# Patient Record
Sex: FEMALE | Race: WHITE | NOT HISPANIC OR LATINO | Employment: OTHER | URBAN - NONMETROPOLITAN AREA
[De-identification: names, ages, dates, MRNs, and addresses within clinical notes are randomized per-mention and may not be internally consistent; named-entity substitution may affect disease eponyms.]

---

## 2017-01-17 RX ORDER — FUROSEMIDE 20 MG/1
TABLET ORAL
Qty: 30 TABLET | Refills: 2 | Status: SHIPPED | OUTPATIENT
Start: 2017-01-17 | End: 2017-04-21 | Stop reason: SDUPTHER

## 2017-03-21 RX ORDER — OMEPRAZOLE 20 MG/1
CAPSULE, DELAYED RELEASE ORAL
Qty: 30 CAPSULE | Refills: 7 | OUTPATIENT
Start: 2017-03-21

## 2017-04-21 RX ORDER — FUROSEMIDE 20 MG/1
TABLET ORAL
Qty: 30 TABLET | Refills: 0 | Status: SHIPPED | OUTPATIENT
Start: 2017-04-21 | End: 2018-03-22 | Stop reason: SDUPTHER

## 2017-05-08 ENCOUNTER — OFFICE VISIT (OUTPATIENT)
Dept: CARDIOLOGY | Facility: CLINIC | Age: 72
End: 2017-05-08

## 2017-05-08 VITALS
DIASTOLIC BLOOD PRESSURE: 58 MMHG | HEIGHT: 60 IN | HEART RATE: 60 BPM | BODY MASS INDEX: 36.32 KG/M2 | WEIGHT: 185 LBS | SYSTOLIC BLOOD PRESSURE: 110 MMHG

## 2017-05-08 DIAGNOSIS — R06.02 SHORTNESS OF BREATH: ICD-10-CM

## 2017-05-08 DIAGNOSIS — E78.00 PURE HYPERCHOLESTEROLEMIA: ICD-10-CM

## 2017-05-08 DIAGNOSIS — I25.9 IHD (ISCHEMIC HEART DISEASE): Primary | ICD-10-CM

## 2017-05-08 DIAGNOSIS — I25.118 CORONARY ARTERY DISEASE OF NATIVE ARTERY OF NATIVE HEART WITH STABLE ANGINA PECTORIS (HCC): ICD-10-CM

## 2017-05-08 DIAGNOSIS — I10 ESSENTIAL HYPERTENSION: ICD-10-CM

## 2017-05-08 DIAGNOSIS — F17.210 CIGARETTE SMOKER: ICD-10-CM

## 2017-05-08 PROCEDURE — 99213 OFFICE O/P EST LOW 20 MIN: CPT | Performed by: NURSE PRACTITIONER

## 2017-05-08 RX ORDER — ALPRAZOLAM 1 MG/1
1 TABLET ORAL 2 TIMES DAILY
COMMUNITY
End: 2019-01-02 | Stop reason: DRUGHIGH

## 2017-05-08 RX ORDER — PRAVASTATIN SODIUM 40 MG
40 TABLET ORAL DAILY
COMMUNITY
End: 2018-03-22 | Stop reason: SDUPTHER

## 2017-05-08 RX ORDER — NITROGLYCERIN 120 MG/1
1 PATCH TRANSDERMAL DAILY
Qty: 30 PATCH | Refills: 8 | Status: SHIPPED | OUTPATIENT
Start: 2017-05-08 | End: 2018-02-16 | Stop reason: SDUPTHER

## 2017-05-08 RX ORDER — FENOFIBRATE 145 MG/1
145 TABLET, COATED ORAL DAILY
COMMUNITY
End: 2018-03-22 | Stop reason: SDUPTHER

## 2017-05-08 RX ORDER — AMLODIPINE BESYLATE 2.5 MG/1
2.5 TABLET ORAL DAILY
COMMUNITY
End: 2017-05-08 | Stop reason: SDUPTHER

## 2017-05-08 RX ORDER — RANOLAZINE 1000 MG/1
1000 TABLET, EXTENDED RELEASE ORAL 2 TIMES DAILY
COMMUNITY
End: 2017-10-23 | Stop reason: SDUPTHER

## 2017-05-08 RX ORDER — AMLODIPINE BESYLATE 2.5 MG/1
2.5 TABLET ORAL DAILY
Qty: 30 TABLET | Refills: 8 | Status: SHIPPED | OUTPATIENT
Start: 2017-05-08 | End: 2017-09-21 | Stop reason: ALTCHOICE

## 2017-05-08 RX ORDER — NAPROXEN 375 MG/1
375 TABLET ORAL 2 TIMES DAILY PRN
COMMUNITY
End: 2017-09-21 | Stop reason: ALTCHOICE

## 2017-05-26 RX ORDER — OMEPRAZOLE 20 MG/1
CAPSULE, DELAYED RELEASE ORAL
Qty: 30 CAPSULE | Refills: 7 | OUTPATIENT
Start: 2017-05-26

## 2017-06-05 RX ORDER — FUROSEMIDE 20 MG/1
TABLET ORAL
Qty: 30 TABLET | Refills: 0 | OUTPATIENT
Start: 2017-06-05

## 2017-06-05 NOTE — TELEPHONE ENCOUNTER
Per labs 2/27/17 patient's bun 28.0, creatinine 1.12, bun creatinine ratio 25.0 is it okay to fill lasix?

## 2017-06-20 RX ORDER — PRAVASTATIN SODIUM 40 MG
TABLET ORAL
Qty: 30 TABLET | Refills: 7 | Status: SHIPPED | OUTPATIENT
Start: 2017-06-20 | End: 2018-03-18 | Stop reason: SDUPTHER

## 2017-06-20 RX ORDER — FENOFIBRATE 145 MG/1
TABLET, COATED ORAL
Qty: 30 TABLET | Refills: 7 | Status: SHIPPED | OUTPATIENT
Start: 2017-06-20 | End: 2017-09-21 | Stop reason: SDUPTHER

## 2017-07-21 ENCOUNTER — TELEPHONE (OUTPATIENT)
Dept: CARDIOLOGY | Facility: CLINIC | Age: 72
End: 2017-07-21

## 2017-07-21 RX ORDER — ASPIRIN 81 MG/1
81 TABLET ORAL DAILY
Qty: 30 TABLET | Refills: 3 | Status: SHIPPED | OUTPATIENT
Start: 2017-07-21 | End: 2017-11-30 | Stop reason: SDUPTHER

## 2017-07-21 NOTE — TELEPHONE ENCOUNTER
Pharmacy requesting refill on RA Aspirin EC 81mg once daily. Refill on RA Aspirin EC 81mg daily sent to pharmacy.

## 2017-08-11 ENCOUNTER — OUTSIDE FACILITY SERVICE (OUTPATIENT)
Dept: CARDIOLOGY | Facility: CLINIC | Age: 72
End: 2017-08-11

## 2017-08-11 PROCEDURE — 93018 CV STRESS TEST I&R ONLY: CPT | Performed by: INTERNAL MEDICINE

## 2017-08-11 PROCEDURE — 93306 TTE W/DOPPLER COMPLETE: CPT | Performed by: INTERNAL MEDICINE

## 2017-08-11 PROCEDURE — 78452 HT MUSCLE IMAGE SPECT MULT: CPT | Performed by: INTERNAL MEDICINE

## 2017-08-11 PROCEDURE — 99223 1ST HOSP IP/OBS HIGH 75: CPT | Performed by: INTERNAL MEDICINE

## 2017-08-12 ENCOUNTER — OUTSIDE FACILITY SERVICE (OUTPATIENT)
Dept: CARDIOLOGY | Facility: CLINIC | Age: 72
End: 2017-08-12

## 2017-08-12 PROCEDURE — 99232 SBSQ HOSP IP/OBS MODERATE 35: CPT | Performed by: INTERNAL MEDICINE

## 2017-08-14 ENCOUNTER — OUTSIDE FACILITY SERVICE (OUTPATIENT)
Dept: CARDIOLOGY | Facility: CLINIC | Age: 72
End: 2017-08-14

## 2017-08-14 PROCEDURE — 99232 SBSQ HOSP IP/OBS MODERATE 35: CPT | Performed by: INTERNAL MEDICINE

## 2017-08-15 ENCOUNTER — OUTSIDE FACILITY SERVICE (OUTPATIENT)
Dept: CARDIOLOGY | Facility: CLINIC | Age: 72
End: 2017-08-15

## 2017-08-15 PROCEDURE — 99232 SBSQ HOSP IP/OBS MODERATE 35: CPT | Performed by: INTERNAL MEDICINE

## 2017-08-17 ENCOUNTER — OUTSIDE FACILITY SERVICE (OUTPATIENT)
Dept: CARDIOLOGY | Facility: CLINIC | Age: 72
End: 2017-08-17

## 2017-08-17 PROCEDURE — 99232 SBSQ HOSP IP/OBS MODERATE 35: CPT | Performed by: INTERNAL MEDICINE

## 2017-09-21 ENCOUNTER — OFFICE VISIT (OUTPATIENT)
Dept: CARDIOLOGY | Facility: CLINIC | Age: 72
End: 2017-09-21

## 2017-09-21 VITALS — SYSTOLIC BLOOD PRESSURE: 112 MMHG | HEIGHT: 60 IN | HEART RATE: 64 BPM | DIASTOLIC BLOOD PRESSURE: 54 MMHG

## 2017-09-21 DIAGNOSIS — I25.9 IHD (ISCHEMIC HEART DISEASE): ICD-10-CM

## 2017-09-21 DIAGNOSIS — R42 DIZZINESS: ICD-10-CM

## 2017-09-21 DIAGNOSIS — I10 ESSENTIAL HYPERTENSION: ICD-10-CM

## 2017-09-21 DIAGNOSIS — E78.00 PURE HYPERCHOLESTEROLEMIA: ICD-10-CM

## 2017-09-21 DIAGNOSIS — Z72.0 TOBACCO ABUSE: ICD-10-CM

## 2017-09-21 DIAGNOSIS — I73.9 PERIPHERAL VASCULAR DISEASE OF LOWER EXTREMITY (HCC): Primary | ICD-10-CM

## 2017-09-21 PROCEDURE — 99214 OFFICE O/P EST MOD 30 MIN: CPT | Performed by: NURSE PRACTITIONER

## 2017-09-21 RX ORDER — METOPROLOL TARTRATE 50 MG/1
50 TABLET, FILM COATED ORAL 2 TIMES DAILY
COMMUNITY
End: 2018-03-22 | Stop reason: SDUPTHER

## 2017-09-21 RX ORDER — CLOPIDOGREL BISULFATE 75 MG/1
75 TABLET ORAL DAILY
COMMUNITY
End: 2018-03-22 | Stop reason: SDUPTHER

## 2017-09-21 NOTE — PROGRESS NOTES
Chief Complaint   Patient presents with   • Hospital Follow-up     Denies shortness of breath or palpitations. Pharmacy will call if refills are needed. Consult and echo in patient chart. Will obtain MARIAH and stress. Wanted to talk about her BP meds- says her BP has been running low.    • Chest Pain     Occasional tightness in her chest.        Subjective       Ana Lenz is a 71 y.o. female  with a history IHD diagnosed in 2016 when she underwent bypass surgery and LA clipping.  She also has history of CVA for which she has been on Coumadin since 2002.  In November of 2015, she underwent stress and echo which showed no significant ischemia.  She continued to be managed medically until symptoms worsened and cardiac cath showed significant disease of LAD, left main, and RCA.  At her last follow up she reported more symtpoms. Stress test showed small area of latera wall ischemia. The dose of Imdur was increased which was later changed to nitro patch secondary to GI discomfort.  She recently developed more chest pain and leg pain, ischemic ulcer on left foot and swelling, L>R, and was admitted to the hospital.  Her stress test showed anteroseptal infarct with mild tanya-infarct ischemia and LVEF 53%.  She then underwent arterial ultrasound which showed significant abnormality followed by peripheral angiogram with angioplasty and stenting of the left mid and distal superficial femoral artery.  She was discharged with Plavix.  She came in today for her follow up visit with significant improvement of leg pain.  She hasn't had anymore chest pain, and shortness of breath is about the same.  Her main problem now is dizziness and low blood pressure, apparently down to 80s/60s at times.  She unfortunately continues to smoke but trying to cut down.     HPI         Cardiac History:    Past Surgical History:   Procedure Laterality Date   • CARDIAC CATHETERIZATION  10/22/2003    non-critical disease of LAD and RCA   • CARDIAC  CATHETERIZATION  12/06/2006    sig calcification. IVUS showed no sig lesions   • CARDIAC CATHETERIZATION  02/26/2016    LM 60% (IVUS) 90% LAD, 70% RCA   • CARDIOVASCULAR STRESS TEST  03/25/2008    D. Myoview- 53% THR, baseline LBBB, R/O ischemia   • CARDIOVASCULAR STRESS TEST  05/30/2013    L Myoview (LBBB) negative    • CARDIOVASCULAR STRESS TEST  11/09/2015    L. Myoview- (LBBB) Negative    • CARDIOVASCULAR STRESS TEST  10/26/2016    Lexiscan-small lateral wall ischemia, increase Imdur, cath is symptoms persist   • CONVERTED (HISTORICAL) HOLTER  08/12/2008    AVG HR 88 bpm, LBBB, rare PVC's and PAC's   • CORONARY ARTERY BYPASS GRAFT  03/18/2016    (Dr. Hernandez) LIMA-LAD, SVG-D1, SVG-PDA, LA clipping   • ECHO - CONVERTED  03/25/2008    EF >60%, LBBB   • ECHO - CONVERTED  01/31/2012    EF 60-65%, RVSP 41 mmHg   • ECHO - CONVERTED  05/30/2013    EF 60-65%, RVSP 44 mmHg   • ECHO - CONVERTED  11/09/2015    EF 65%. RVSP- 50 mmHg    • US CAROTID UNILATERAL  09/03/2008    <49% stenosis bilat   • US CAROTID UNILATERAL  11/18/2015    normal       Current Outpatient Prescriptions   Medication Sig Dispense Refill   • ALPRAZolam (XANAX) 1 MG tablet Take 1 mg by mouth 2 (Two) Times a Day As Needed for Anxiety.     • aspirin (RA ASPIRIN EC) 81 MG EC tablet Take 1 tablet by mouth Daily. 30 tablet 3   • cholecalciferol (VITAMIN D3) 1000 UNITS tablet Take 1,000 Units by mouth 2 (Two) Times a Day.     • clopidogrel (PLAVIX) 75 MG tablet Take 75 mg by mouth Daily.     • fenofibrate (TRICOR) 145 MG tablet Take 145 mg by mouth Daily.     • ferrous sulfate 325 (65 FE) MG tablet Take 325 mg by mouth Daily With Breakfast.     • furosemide (LASIX) 20 MG tablet take 1 tablet by mouth once daily 30 tablet 0   • gabapentin (NEURONTIN) 300 MG capsule Take 300 mg by mouth 3 (Three) Times a Day.     • loratadine (CLARITIN) 10 MG tablet Take 10 mg by mouth Daily.     • metoprolol tartrate (LOPRESSOR) 50 MG tablet Take 50 mg by mouth 2 (Two) Times a  Day.     • nitroglycerin (NITRODUR) 0.6 MG/HR patch Place 1 patch on the skin Daily. 30 patch 8   • O2 (OXYGEN) 1.5 liters PRN     • oxyCODONE-acetaminophen (PERCOCET) 5-325 MG per tablet Take 1 tablet by mouth Every 6 (Six) Hours As Needed.     • potassium chloride (K-DUR,KLOR-CON) 20 MEQ CR tablet Take 20 mEq by mouth Daily.     • pravastatin (PRAVACHOL) 40 MG tablet take 1 tablet by mouth once daily 30 tablet 7   • ranolazine (RANEXA) 1000 MG 12 hr tablet Take 1,000 mg by mouth 2 (Two) Times a Day.     • pravastatin (PRAVACHOL) 40 MG tablet Take 40 mg by mouth Daily.       No current facility-administered medications for this visit.        Morphine and related and Prednisone    Past Medical History:   Diagnosis Date   • Chest pain    • DDD (degenerative disc disease), lumbar    • H/O: hysterectomy    • Hx of appendectomy    • Hx of cholecystectomy    • IHD (ischemic heart disease)    • LBBB (left bundle branch block)    • Palpitations    • S/P cataract surgery     left   • SOB (shortness of breath)        Social History     Social History   • Marital status:      Spouse name: N/A   • Number of children: N/A   • Years of education: N/A     Occupational History   • Not on file.     Social History Main Topics   • Smoking status: Current Every Day Smoker     Packs/day: 0.50     Types: Cigarettes   • Smokeless tobacco: Never Used   • Alcohol use No   • Drug use: No   • Sexual activity: Not on file     Other Topics Concern   • Not on file     Social History Narrative       Family History   Problem Relation Age of Onset   • Heart disease Mother    • Heart disease Father    • Heart disease Other      CABG   • Heart attack Daughter    • Heart disease Daughter        Review of Systems   Constitution: Positive for weakness and malaise/fatigue.   Eyes: Negative for blurred vision.   Cardiovascular: Positive for dyspnea on exertion. Negative for chest pain and leg swelling (improved).   Respiratory: Positive for cough  "(r/t smoking ) and shortness of breath (stable ).    Skin: Positive for color change and poor wound healing (improved post stenting ).   Musculoskeletal: Positive for back pain and muscle weakness.   Gastrointestinal: Negative for abdominal pain, heartburn, nausea and vomiting.   Genitourinary: Negative for dysuria and hematuria.   Neurological: Positive for dizziness (r/t low bp ) and light-headedness.   Psychiatric/Behavioral: Negative for altered mental status and depression.        Diabetes- No  Thyroid-not available     Objective     /54  Pulse 64  Ht 60\" (152.4 cm)    Physical Exam   Constitutional: She is oriented to person, place, and time. She appears well-developed and well-nourished.   HENT:   Head: Normocephalic and atraumatic.   Eyes: Pupils are equal, round, and reactive to light.   Neck: Normal range of motion. Neck supple. No thyromegaly present.   Cardiovascular: Normal rate, regular rhythm, S1 normal, S2 normal and intact distal pulses.    Murmur heard.  Pulses:       Dorsalis pedis pulses are 1+ on the right side, and 2+ on the left side.        Posterior tibial pulses are 1+ on the right side, and 2+ on the left side.   Pulmonary/Chest: Effort normal. No respiratory distress. She has wheezes.   Abdominal: Soft. Bowel sounds are normal. She exhibits no distension. There is no tenderness.   Musculoskeletal: Normal range of motion. She exhibits edema (trace-1+ Left).   Neurological: She is alert and oriented to person, place, and time. She has normal reflexes.   Skin: Skin is warm and dry. There is erythema (LLE mildy erythematous with good pulse).   Psychiatric: She has a normal mood and affect. Her behavior is normal.          Procedures          Assessment/Plan      Heart rate is stable.  Blood pressure low normal.  Based on her reports and today's bp, she was advised to hold amlodipine for now.  Continue all other medications.  She is ambulating better with her walker.  She has received " wound care for her left foot ulcer which she reports has improved.  She isn't having any chest pain, so she will continue to be monitored and managed medically.  She was encouraged again on smoking cessation especially with her recent peripheral stent.  She was advised to monitor blood pressure and let us know if remains low.  We will see her back in six months or sooner if she develops more problems.      Ana was seen today for hospital follow-up and chest pain.    Diagnoses and all orders for this visit:    Peripheral vascular disease of lower extremity    IHD (ischemic heart disease)    Dizziness    Essential hypertension    Tobacco abuse    Pure hypercholesterolemia                    Electronically signed by JOSE Dominguez,  September 22, 2017 11:47 AM

## 2017-10-23 RX ORDER — RANOLAZINE 1000 MG/1
TABLET, FILM COATED, EXTENDED RELEASE ORAL
Qty: 60 TABLET | Refills: 7 | Status: SHIPPED | OUTPATIENT
Start: 2017-10-23 | End: 2018-03-22 | Stop reason: SDUPTHER

## 2017-11-30 RX ORDER — ACETAMINOPHEN/DIPHENHYDRAMINE 500MG-25MG
TABLET ORAL
Qty: 30 TABLET | Refills: 11 | Status: SHIPPED | OUTPATIENT
Start: 2017-11-30 | End: 2018-05-22 | Stop reason: SDUPTHER

## 2018-02-16 RX ORDER — NITROGLYCERIN 120 MG/1
PATCH TRANSDERMAL
Qty: 30 PATCH | Refills: 8 | Status: SHIPPED | OUTPATIENT
Start: 2018-02-16 | End: 2018-03-22 | Stop reason: SDUPTHER

## 2018-03-20 RX ORDER — PRAVASTATIN SODIUM 40 MG
TABLET ORAL
Qty: 30 TABLET | Refills: 7 | Status: SHIPPED | OUTPATIENT
Start: 2018-03-20 | End: 2018-03-22 | Stop reason: SDUPTHER

## 2018-03-20 RX ORDER — FENOFIBRATE 145 MG/1
TABLET, COATED ORAL
Qty: 30 TABLET | Refills: 7 | Status: SHIPPED | OUTPATIENT
Start: 2018-03-20 | End: 2018-03-22 | Stop reason: SDUPTHER

## 2018-03-20 NOTE — TELEPHONE ENCOUNTER
Yes, refill please.      She is coming in for follow up in 2 days.  Will address then.      Thank you

## 2018-03-20 NOTE — TELEPHONE ENCOUNTER
Estrella    Received refill request on fenofibrate and pravachol. No recent labs in chart. Is it okay to refill? Thank you.

## 2018-03-22 ENCOUNTER — OFFICE VISIT (OUTPATIENT)
Dept: CARDIOLOGY | Facility: CLINIC | Age: 73
End: 2018-03-22

## 2018-03-22 VITALS — SYSTOLIC BLOOD PRESSURE: 120 MMHG | HEIGHT: 60 IN | HEART RATE: 60 BPM | DIASTOLIC BLOOD PRESSURE: 70 MMHG

## 2018-03-22 DIAGNOSIS — I10 ESSENTIAL HYPERTENSION: ICD-10-CM

## 2018-03-22 DIAGNOSIS — Z72.0 TOBACCO ABUSE: ICD-10-CM

## 2018-03-22 DIAGNOSIS — R00.2 PALPITATIONS: ICD-10-CM

## 2018-03-22 DIAGNOSIS — I73.9 PVD (PERIPHERAL VASCULAR DISEASE) (HCC): ICD-10-CM

## 2018-03-22 DIAGNOSIS — E78.00 PURE HYPERCHOLESTEROLEMIA: ICD-10-CM

## 2018-03-22 DIAGNOSIS — I25.9 IHD (ISCHEMIC HEART DISEASE): Primary | ICD-10-CM

## 2018-03-22 PROCEDURE — 99406 BEHAV CHNG SMOKING 3-10 MIN: CPT | Performed by: NURSE PRACTITIONER

## 2018-03-22 PROCEDURE — 99214 OFFICE O/P EST MOD 30 MIN: CPT | Performed by: NURSE PRACTITIONER

## 2018-03-22 RX ORDER — FENOFIBRATE 145 MG/1
145 TABLET, COATED ORAL DAILY
Qty: 30 TABLET | Refills: 7 | Status: SHIPPED | OUTPATIENT
Start: 2018-03-22 | End: 2018-05-22 | Stop reason: ALTCHOICE

## 2018-03-22 RX ORDER — POTASSIUM CHLORIDE 20 MEQ/1
20 TABLET, EXTENDED RELEASE ORAL DAILY
Qty: 30 TABLET | Refills: 8 | Status: SHIPPED | OUTPATIENT
Start: 2018-03-22 | End: 2019-01-02

## 2018-03-22 RX ORDER — CLOPIDOGREL BISULFATE 75 MG/1
75 TABLET ORAL DAILY
Qty: 30 TABLET | Refills: 7 | Status: SHIPPED | OUTPATIENT
Start: 2018-03-22 | End: 2019-05-13 | Stop reason: SDUPTHER

## 2018-03-22 RX ORDER — RANOLAZINE 1000 MG/1
1 TABLET, EXTENDED RELEASE ORAL EVERY 12 HOURS
Qty: 60 TABLET | Refills: 7 | Status: SHIPPED | OUTPATIENT
Start: 2018-03-22 | End: 2019-01-02 | Stop reason: SDUPTHER

## 2018-03-22 RX ORDER — FUROSEMIDE 20 MG/1
20 TABLET ORAL DAILY
Qty: 30 TABLET | Refills: 7 | Status: SHIPPED | OUTPATIENT
Start: 2018-03-22 | End: 2019-01-02

## 2018-03-22 RX ORDER — METOPROLOL TARTRATE 50 MG/1
50 TABLET, FILM COATED ORAL EVERY 12 HOURS SCHEDULED
Qty: 60 TABLET | Refills: 7 | Status: SHIPPED | OUTPATIENT
Start: 2018-03-22 | End: 2018-05-22 | Stop reason: DRUGHIGH

## 2018-03-22 RX ORDER — NITROGLYCERIN 120 MG/1
1 PATCH TRANSDERMAL DAILY
Qty: 30 PATCH | Refills: 8 | Status: SHIPPED | OUTPATIENT
Start: 2018-03-22 | End: 2019-01-02 | Stop reason: SDUPTHER

## 2018-03-22 RX ORDER — PRAVASTATIN SODIUM 40 MG
40 TABLET ORAL DAILY
Qty: 30 TABLET | Refills: 7 | Status: SHIPPED | OUTPATIENT
Start: 2018-03-22 | End: 2018-05-22 | Stop reason: ALTCHOICE

## 2018-03-27 ENCOUNTER — OUTSIDE FACILITY SERVICE (OUTPATIENT)
Dept: CARDIOLOGY | Facility: CLINIC | Age: 73
End: 2018-03-27

## 2018-03-27 PROCEDURE — 99223 1ST HOSP IP/OBS HIGH 75: CPT | Performed by: INTERNAL MEDICINE

## 2018-03-27 PROCEDURE — 93308 TTE F-UP OR LMTD: CPT | Performed by: INTERNAL MEDICINE

## 2018-03-28 ENCOUNTER — OUTSIDE FACILITY SERVICE (OUTPATIENT)
Dept: CARDIOLOGY | Facility: CLINIC | Age: 73
End: 2018-03-28

## 2018-03-28 PROCEDURE — 93459 L HRT ART/GRFT ANGIO: CPT | Performed by: INTERNAL MEDICINE

## 2018-03-29 ENCOUNTER — OUTSIDE FACILITY SERVICE (OUTPATIENT)
Dept: CARDIOLOGY | Facility: CLINIC | Age: 73
End: 2018-03-29

## 2018-03-29 PROCEDURE — 99232 SBSQ HOSP IP/OBS MODERATE 35: CPT | Performed by: INTERNAL MEDICINE

## 2018-05-22 ENCOUNTER — OFFICE VISIT (OUTPATIENT)
Dept: CARDIOLOGY | Facility: CLINIC | Age: 73
End: 2018-05-22

## 2018-05-22 VITALS — HEIGHT: 60 IN | SYSTOLIC BLOOD PRESSURE: 110 MMHG | DIASTOLIC BLOOD PRESSURE: 60 MMHG | HEART RATE: 64 BPM

## 2018-05-22 DIAGNOSIS — I73.9 PVD (PERIPHERAL VASCULAR DISEASE) (HCC): ICD-10-CM

## 2018-05-22 DIAGNOSIS — I25.9 IHD (ISCHEMIC HEART DISEASE): Primary | ICD-10-CM

## 2018-05-22 DIAGNOSIS — E78.00 PURE HYPERCHOLESTEROLEMIA: ICD-10-CM

## 2018-05-22 DIAGNOSIS — Z72.0 TOBACCO ABUSE: ICD-10-CM

## 2018-05-22 DIAGNOSIS — I10 ESSENTIAL HYPERTENSION: ICD-10-CM

## 2018-05-22 PROCEDURE — 99214 OFFICE O/P EST MOD 30 MIN: CPT | Performed by: NURSE PRACTITIONER

## 2018-05-22 RX ORDER — GABAPENTIN 100 MG/1
100 CAPSULE ORAL 3 TIMES DAILY
COMMUNITY
End: 2019-01-02 | Stop reason: DRUGHIGH

## 2018-05-22 RX ORDER — ASPIRIN 81 MG/1
TABLET ORAL
Qty: 60 TABLET | Refills: 11 | Status: SHIPPED | OUTPATIENT
Start: 2018-05-22 | End: 2019-08-19 | Stop reason: SDUPTHER

## 2018-05-22 RX ORDER — ATORVASTATIN CALCIUM 10 MG/1
10 TABLET, FILM COATED ORAL DAILY
COMMUNITY
End: 2018-06-06

## 2018-05-22 RX ORDER — NITROGLYCERIN 0.4 MG/1
TABLET SUBLINGUAL
Qty: 100 TABLET | Refills: 11 | Status: SHIPPED | OUTPATIENT
Start: 2018-05-22

## 2018-05-22 NOTE — PROGRESS NOTES
"Chief Complaint   Patient presents with   • Hospital follow up     Patient had cardiac cath 3/28/18 with stent placement. She reports having multiple falls in the last year. Daughter reports Metoprolol was decreased to 12.5mg bid. Daughter verbalized current medication list. Last labs 5/4/18 per PCP.   • Chest Pain     She states \"having some sharp pains and some burning pains all over chest.   • Shortness of Breath     She reports with exertion.       Subjective       Ana Lenz is a 72 y.o. female with hypertension, hyperlipidemia, PVD, and IHD s/p bypass surgery and LA clipping in March 2016.  She also has a history of CVA and was treated with Coumadin until clipping.  Stress test done in October 2016 showed small lateral ischemia with medical management and increase in Imdur then later changed to nitro patch secondary to GI discomfort  She then developed more chest pain and leg pain, ischemic ulcer on left foot with swelling, L>R, and was admitted to the hospital.  Her stress test showed anteroseptal infarct with mild tanya-infarct ischemia and LVEF 53%.  She then underwent arterial ultrasound which showed significant abnormality followed by peripheral angiogram with angioplasty and stenting of the left mid and distal superficial femoral artery on 8/16/17.  She was then discharged with Plavix. She came in recently for follow up visit and reported some mild chest pain but declined any investigation.  Five days later she presented to the ER with chest pain and significantly elevated bp.  MI was ruled out and she underwent echo and stress showing anterior wall ischemia with EF around 50%.  Cardiac cath showed worsening left main disease and disease of circumflex which were stented.  At discharge, metoprolol was reduced to 12.5 mg BID for bradycardia, and pravastatin changed to Lipitor.  CXR showed rounded, mass-like density in the left lung with further evaluation recommended. History is notable for " histoplasmosis.  Arterial doppler showed several distal vascular disease.  Inpatient labs:  CBC stable, cholesterol 179, Tri 239, HDL 34, .  BUN/Cr 35/1.1, TSH 0.82, CrCl 67, P2Y12 196, indicating adequate platelet inhibition.  She came in today for her hospital follow up visit with her daughter.  Overall, she has had improvement in cardiac symptoms.  Chest pain is present and described as sharp and burning at times but less often and less severe.  She is mostly confined to wheelchair so does not exert much.  She denies leg pain at rest.  She is short of breath at times.  Her daughter feels chest pain and SOB are worse with mental and emotional stress.  She continues to smoke but has cut back to four per day.  Labs were recently checked with Dr. Gamboa.  Daughter reports fenofibrate stopped when atorvastatin started.    HPI         Cardiac History:    Past Surgical History:   Procedure Laterality Date   • CARDIAC CATHETERIZATION  10/22/2003    non-critical disease of LAD and RCA   • CARDIAC CATHETERIZATION  12/06/2006    sig calcification. IVUS showed no sig lesions   • CARDIAC CATHETERIZATION  02/26/2016    LM 60% (IVUS) 90% LAD, 70% RCA   • CARDIAC CATHETERIZATION  03/28/2018    100% SVG to D1. 95% LM 4.0x12. 75% LCX- 3.0x28 & 3.5x8 Promus   • CARDIOVASCULAR STRESS TEST  03/25/2008    D. Myoview- 53% THR, baseline LBBB, R/O ischemia   • CARDIOVASCULAR STRESS TEST  05/30/2013    L Myoview (LBBB) negative    • CARDIOVASCULAR STRESS TEST  11/09/2015    LKristine Myoview- (LBBB) Negative    • CARDIOVASCULAR STRESS TEST  10/26/2016    Lexiscan-small lateral wall ischemia, increase Imdur, cath is symptoms persist   • CARDIOVASCULAR STRESS TEST  03/26/2018    @[email or web address]. Dr. Cabrera. SARITHA Smith- Anterior Ischemia   • CONVERTED (HISTORICAL) HOLTER  08/12/2008    AVG HR 88 bpm, LBBB, rare PVC's and PAC's   • CORONARY ARTERY BYPASS GRAFT  03/18/2016    (Dr. Hernandez) LIMA-LAD, SVG-D1, SVG-PDA, LA clipping   • ECHO - CONVERTED   03/25/2008    EF >60%, LBBB   • ECHO - CONVERTED  01/31/2012    EF 60-65%, RVSP 41 mmHg   • ECHO - CONVERTED  05/30/2013    EF 60-65%, RVSP 44 mmHg   • ECHO - CONVERTED  11/09/2015    EF 65%. RVSP- 50 mmHg    • ECHO - CONVERTED  03/27/2018    @[facility]. EF 60%   • PERIPHERAL ARTERIAL STENT GRAFT  08/16/2017    Angioplasty/stenting 6mm x 120mm EverFlex stent of L mid and distal superficial femoral artery    • US ARTERIAL DOPPLER LOWER EXTREMITY  UNILATERAL  08/11/2017    abnormal flow- significant PVD   • US CAROTID UNILATERAL  09/03/2008    <49% stenosis bilat   • US CAROTID UNILATERAL  11/18/2015    normal       Current Outpatient Prescriptions   Medication Sig Dispense Refill   • ALPRAZolam (XANAX) 1 MG tablet Take 1 mg by mouth 2 (Two) Times a Day.     • aspirin (RA ASPIRIN EC) 81 MG EC tablet Increase to twice daily 60 tablet 11   • atorvastatin (LIPITOR) 10 MG tablet Take 10 mg by mouth Daily.     • cholecalciferol (VITAMIN D3) 1000 UNITS tablet Take 1,000 Units by mouth 2 (Two) Times a Day.     • clopidogrel (PLAVIX) 75 MG tablet Take 1 tablet by mouth Daily. 30 tablet 7   • ferrous sulfate 325 (65 FE) MG tablet Take 325 mg by mouth 3 (Three) Times a Day With Meals.     • furosemide (LASIX) 20 MG tablet Take 1 tablet by mouth Daily. 30 tablet 7   • gabapentin (NEURONTIN) 100 MG capsule Take 100 mg by mouth 3 (Three) Times a Day.     • metoprolol tartrate (LOPRESSOR) 25 MG tablet Take 12.5 mg by mouth 2 (Two) Times a Day.     • nitroglycerin (NITRODUR) 0.6 MG/HR patch Place 1 patch on the skin Daily. 30 patch 8   • O2 (OXYGEN) 1.5 liters PRN     • oxyCODONE-acetaminophen (PERCOCET) 5-325 MG per tablet Take 1 tablet by mouth Every 8 (Eight) Hours As Needed.     • potassium chloride (K-DUR,KLOR-CON) 20 MEQ CR tablet Take 1 tablet by mouth Daily. (Patient taking differently: Take 20 mEq by mouth 2 (Two) Times a Day.) 30 tablet 8   • ranolazine (RANEXA) 1000 MG 12 hr tablet Take 1 tablet by mouth Every 12 (Twelve)  Hours. 60 tablet 7   • nitroglycerin (NITROSTAT) 0.4 MG SL tablet 1 under the tongue as needed for angina, may repeat q5mins for up three doses 100 tablet 11     No current facility-administered medications for this visit.        Morphine and related and Prednisone    Past Medical History:   Diagnosis Date   • Chest pain    • DDD (degenerative disc disease), lumbar    • H/O: hysterectomy    • Hx of appendectomy    • Hx of cholecystectomy    • IHD (ischemic heart disease)    • LBBB (left bundle branch block)    • Palpitations    • S/P cataract surgery     left   • SOB (shortness of breath)        Social History     Social History   • Marital status:      Spouse name: N/A   • Number of children: N/A   • Years of education: N/A     Occupational History   • Not on file.     Social History Main Topics   • Smoking status: Current Every Day Smoker     Packs/day: 0.25     Types: Cigarettes   • Smokeless tobacco: Never Used   • Alcohol use No   • Drug use: No   • Sexual activity: Not on file     Other Topics Concern   • Not on file     Social History Narrative   • No narrative on file       Family History   Problem Relation Age of Onset   • Heart disease Mother    • Heart disease Father    • Heart disease Other         CABG   • Heart attack Daughter    • Heart disease Daughter      Review of Systems   Constitution: Positive for weakness. Negative for decreased appetite and weight loss (unable to weigh, wheelchair).   HENT: Negative.    Eyes: Negative.    Cardiovascular: Positive for chest pain (mostly with anxiety per daughter). Negative for claudication (does not ambulate), leg swelling, orthopnea, palpitations and syncope.   Respiratory: Positive for cough and shortness of breath.    Endocrine: Negative.    Hematologic/Lymphatic: Negative for bleeding problem. Does not bruise/bleed easily.   Skin: Negative for flushing and rash.   Musculoskeletal: Positive for arthritis and joint pain. Negative for falls and  "myalgias.   Gastrointestinal: Negative for abdominal pain and melena.   Genitourinary: Negative for dysuria and hematuria.   Neurological: Negative for dizziness.   Psychiatric/Behavioral: Negative for altered mental status. The patient is nervous/anxious.       Diabetes- No  Thyroid-normal    Objective     /60 (BP Location: Left arm)   Pulse 64   Ht 152.4 cm (60\")     Physical Exam   Constitutional: She is oriented to person, place, and time. She appears well-developed and well-nourished.   HENT:   Head: Normocephalic.   Eyes: Pupils are equal, round, and reactive to light.   Neck: Normal range of motion.   Cardiovascular: Normal rate and regular rhythm.    Pulses:       Popliteal pulses are 1+ on the right side, and 1+ on the left side.        Dorsalis pedis pulses are 1+ on the right side, and 1+ on the left side.   Pulmonary/Chest: Effort normal and breath sounds normal. No respiratory distress. She has no wheezes.   Abdominal: Soft.   Musculoskeletal: Normal range of motion. She exhibits no edema.   Neurological: She is alert and oriented to person, place, and time.   Skin: Skin is warm and dry.   Psychiatric: She has a normal mood and affect.   Nursing note and vitals reviewed.     Procedures          Assessment/Plan    Heart rate and blood pressure stable.  We reviewed the findings of the cath report and stenting to left main and circumflex.  She is advised to continue Plavix but increase aspirin to 81 mg twice daily.  Advised to continue for one year without stopping.  Continue Lipitor.  Since she is no longer taking fenofibrate, advise on low triglyceride diet.  She was given literature.  She does not feel her chest pain warrants further work up and has declined any further investigation.  Continue nitro patch and Ranexa.  She was given a script for sl nitro 0.4 mg for PRN use.  Advised to go to the ER if chest pain reoccurs and is severe and persisting.  She was encouraged to try short distance " "walking with help which may improve the PVD.  Her daughter is going to get a peddling machine.  She was not felt to be a good surgery candidate with continued smoking, no claudication pain, and no ischemic ulcers.  We briefly discussed smoking cessation but does not feel she can quit.  Upon reviewed hospital records, she was noted to have abnormal CXR with \"ill-defined density in the left lung\" which may represent histoplasmosis.  Will forward report to you for further management.  We will see her back in six months or sooner if needed.  She is aware to contact us should the chest pain worsen, or proceed to the ER.      Ana was seen today for hospital follow up, chest pain and shortness of breath.    Diagnoses and all orders for this visit:    IHD (ischemic heart disease)    Pure hypercholesterolemia    Essential hypertension    Tobacco abuse    PVD (peripheral vascular disease)    Other orders  -     aspirin (RA ASPIRIN EC) 81 MG EC tablet; Increase to twice daily  -     nitroglycerin (NITROSTAT) 0.4 MG SL tablet; 1 under the tongue as needed for angina, may repeat q5mins for up three doses        Patient's There is no height or weight on file to calculate BMI.                  Electronically signed by JOSE Dominguez,  May 22, 2018 4:46 PM  "

## 2018-05-22 NOTE — PATIENT INSTRUCTIONS
Peripheral Vascular Disease  Peripheral vascular disease (PVD) is a disease of the blood vessels that are not part of your heart and brain. A simple term for PVD is poor circulation. In most cases, PVD narrows the blood vessels that carry blood from your heart to the rest of your body. This can result in a decreased supply of blood to your arms, legs, and internal organs, like your stomach or kidneys. However, it most often affects a person’s lower legs and feet.  There are two types of PVD.  · Organic PVD. This is the more common type. It is caused by damage to the structure of blood vessels.  · Functional PVD. This is caused by conditions that make blood vessels contract and tighten (spasm).  Without treatment, PVD tends to get worse over time.  PVD can also lead to acute ischemic limb. This is when an arm or limb suddenly has trouble getting enough blood. This is a medical emergency.  Follow these instructions at home:  · Take medicines only as told by your doctor.  · Do not use any tobacco products, including cigarettes, chewing tobacco, or electronic cigarettes. If you need help quitting, ask your doctor.  · Lose weight if you are overweight, and maintain a healthy weight as told by your doctor.  · Eat a diet that is low in fat and cholesterol. If you need help, ask your doctor.  · Exercise regularly. Ask your doctor for some good activities for you.  · Take good care of your feet.  ¨ Wear comfortable shoes that fit well.  ¨ Check your feet often for any cuts or sores.  Contact a doctor if:  · You have cramps in your legs while walking.  · You have leg pain when you are at rest.  · You have coldness in a leg or foot.  · Your skin changes.  · You are unable to get or have an erection (erectile dysfunction).  · You have cuts or sores on your feet that are not healing.  Get help right away if:  · Your arm or leg turns cold and blue.  · Your arms or legs become red, warm, swollen, painful, or numb.  · You have  "chest pain or trouble breathing.  · You suddenly have weakness in your face, arm, or leg.  · You become very confused or you cannot speak.  · You suddenly have a very bad headache.  · You suddenly cannot see.  This information is not intended to replace advice given to you by your health care provider. Make sure you discuss any questions you have with your health care provider.  Document Released: 03/14/2011 Document Revised: 05/25/2017 Document Reviewed: 05/28/2015  [employer] Interactive Patient Education © 2017 [employer]  Food Choices to Lower Your Triglycerides  Triglycerides are a type of fat in your blood. High levels of triglycerides can increase the risk of heart disease and stroke. If your triglyceride levels are high, the foods you eat and your eating habits are very important. Choosing the right foods can help lower your triglycerides.  What general guidelines do I need to follow?  · Lose weight if you are overweight.  · Limit or avoid alcohol.  · Fill one half of your plate with vegetables and green salads.  · Limit fruit to two servings a day. Choose fruit instead of juice.  · Make one fourth of your plate whole grains. Look for the word \"whole\" as the first word in the ingredient list.  · Fill one fourth of your plate with lean protein foods.  · Enjoy fatty fish (such as salmon, mackerel, sardines, and tuna) three times a week.  · Choose healthy fats.  · Limit foods high in starch and sugar.  · Eat more home-cooked food and less restaurant, buffet, and fast food.  · Limit fried foods.  · Cook foods using methods other than frying.  · Limit saturated fats.  · Check ingredient lists to avoid foods with partially hydrogenated oils (trans fats) in them.  What foods can I eat?  Grains   Whole grains, such as whole wheat or whole grain breads, crackers, cereals, and pasta. Unsweetened oatmeal, bulgur, barley, quinoa, or brown rice. Corn or whole wheat flour tortillas.  Vegetables   Fresh or frozen " vegetables (raw, steamed, roasted, or grilled). Green salads.  Fruits   All fresh, canned (in natural juice), or frozen fruits.  Meat and Other Protein Products   Ground beef (85% or leaner), grass-fed beef, or beef trimmed of fat. Skinless chicken or turkey. Ground chicken or turkey. Pork trimmed of fat. All fish and seafood. Eggs. Dried beans, peas, or lentils. Unsalted nuts or seeds. Unsalted canned or dry beans.  Dairy   Low-fat dairy products, such as skim or 1% milk, 2% or reduced-fat cheeses, low-fat ricotta or cottage cheese, or plain low-fat yogurt.  Fats and Oils   Tub margarines without trans fats. Light or reduced-fat mayonnaise and salad dressings. Avocado. Safflower, olive, or canola oils. Natural peanut or almond butter.  The items listed above may not be a complete list of recommended foods or beverages. Contact your dietitian for more options.   What foods are not recommended?  Grains   White bread. White pasta. White rice. Cornbread. Bagels, pastries, and croissants. Crackers that contain trans fat.  Vegetables   White potatoes. Corn. Creamed or fried vegetables. Vegetables in a cheese sauce.  Fruits   Dried fruits. Canned fruit in light or heavy syrup. Fruit juice.  Meat and Other Protein Products   Fatty cuts of meat. Ribs, chicken wings, brower, sausage, bologna, salami, chitterlings, fatback, hot dogs, bratwurst, and packaged luncheon meats.  Dairy   Whole or 2% milk, cream, half-and-half, and cream cheese. Whole-fat or sweetened yogurt. Full-fat cheeses. Nondairy creamers and whipped toppings. Processed cheese, cheese spreads, or cheese curds.  Sweets and Desserts   Corn syrup, sugars, honey, and molasses. Candy. Jam and jelly. Syrup. Sweetened cereals. Cookies, pies, cakes, donuts, muffins, and ice cream.  Fats and Oils   Butter, stick margarine, lard, shortening, ghee, or brower fat. Coconut, palm kernel, or palm oils.  Beverages   Alcohol. Sweetened drinks (such as sodas, lemonade, and fruit  "drinks or punches).  The items listed above may not be a complete list of foods and beverages to avoid. Contact your dietitian for more information.   This information is not intended to replace advice given to you by your health care provider. Make sure you discuss any questions you have with your health care provider.  Document Released: 10/05/2005 Document Revised: 05/25/2017 Document Reviewed: 10/22/2014  [employer] Interactive Patient Education © 2017 [employer]  DASH Eating Plan  DASH stands for \"Dietary Approaches to Stop Hypertension.\" The DASH eating plan is a healthy eating plan that has been shown to reduce high blood pressure (hypertension). It may also reduce your risk for type 2 diabetes, heart disease, and stroke. The DASH eating plan may also help with weight loss.  What are tips for following this plan?  General guidelines   · Avoid eating more than 2,300 mg (milligrams) of salt (sodium) a day. If you have hypertension, you may need to reduce your sodium intake to 1,500 mg a day.  · Limit alcohol intake to no more than 1 drink a day for nonpregnant women and 2 drinks a day for men. One drink equals 12 oz of beer, 5 oz of wine, or 1½ oz of hard liquor.  · Work with your health care provider to maintain a healthy body weight or to lose weight. Ask what an ideal weight is for you.  · Get at least 30 minutes of exercise that causes your heart to beat faster (aerobic exercise) most days of the week. Activities may include walking, swimming, or biking.  · Work with your health care provider or diet and nutrition specialist (dietitian) to adjust your eating plan to your individual calorie needs.  Reading food labels   · Check food labels for the amount of sodium per serving. Choose foods with less than 5 percent of the Daily Value of sodium. Generally, foods with less than 300 mg of sodium per serving fit into this eating plan.  · To find whole grains, look for the word \"whole\" as the first word in the " "ingredient list.  Shopping   · Buy products labeled as \"low-sodium\" or \"no salt added.\"  · Buy fresh foods. Avoid canned foods and premade or frozen meals.  Cooking   · Avoid adding salt when cooking. Use salt-free seasonings or herbs instead of table salt or sea salt. Check with your health care provider or pharmacist before using salt substitutes.  · Do not [place] foods. Cook foods using healthy methods such as baking, boiling, grilling, and broiling instead.  · Cook with heart-healthy oils, such as olive, canola, soybean, or sunflower oil.  Meal planning     · Eat a balanced diet that includes:  ¨ 5 or more servings of fruits and vegetables each day. At each meal, try to fill half of your plate with fruits and vegetables.  ¨ Up to 6-8 servings of whole grains each day.  ¨ Less than 6 oz of lean meat, poultry, or fish each day. A 3-oz serving of meat is about the same size as a deck of cards. One egg equals 1 oz.  ¨ 2 servings of low-fat dairy each day.  ¨ A serving of nuts, seeds, or beans 5 times each week.  ¨ Heart-healthy fats. Healthy fats called Omega-3 fatty acids are found in foods such as flaxseeds and coldwater fish, like sardines, salmon, and mackerel.  · Limit how much you eat of the following:  ¨ Canned or prepackaged foods.  ¨ Food that is high in trans fat, such as fried foods.  ¨ Food that is high in saturated fat, such as fatty meat.  ¨ Sweets, desserts, sugary drinks, and other foods with added sugar.  ¨ Full-fat dairy products.  · Do not salt foods before eating.  · Try to eat at least 2 vegetarian meals each week.  · Eat more home-cooked food and less restaurant, buffet, and fast food.  · When eating at a restaurant, ask that your food be prepared with less salt or no salt, if possible.  What foods are recommended?  The items listed may not be a complete list. Talk with your dietitian about what dietary choices are best for you.  Grains   Whole-grain or whole-wheat bread. Whole-grain or " whole-wheat pasta. Brown rice. Oatmeal. Quinoa. Bulgur. Whole-grain and low-sodium cereals. Nicole bread. Low-fat, low-sodium crackers. Whole-wheat flour tortillas.  Vegetables   Fresh or frozen vegetables (raw, steamed, roasted, or grilled). Low-sodium or reduced-sodium tomato and vegetable juice. Low-sodium or reduced-sodium tomato sauce and tomato paste. Low-sodium or reduced-sodium canned vegetables.  Fruits   All fresh, dried, or frozen fruit. Canned fruit in natural juice (without added sugar).  Meat and other protein foods   Skinless chicken or turkey. Ground chicken or turkey. Pork with fat trimmed off. Fish and seafood. Egg whites. Dried beans, peas, or lentils. Unsalted nuts, nut butters, and seeds. Unsalted canned beans. Lean cuts of beef with fat trimmed off. Low-sodium, lean deli meat.  Dairy   Low-fat (1%) or fat-free (skim) milk. Fat-free, low-fat, or reduced-fat cheeses. Nonfat, low-sodium ricotta or cottage cheese. Low-fat or nonfat yogurt. Low-fat, low-sodium cheese.  Fats and oils   Soft margarine without trans fats. Vegetable oil. Low-fat, reduced-fat, or light mayonnaise and salad dressings (reduced-sodium). Canola, safflower, olive, soybean, and sunflower oils. Avocado.  Seasoning and other foods   Herbs. Spices. Seasoning mixes without salt. Unsalted popcorn and pretzels. Fat-free sweets.  What foods are not recommended?  The items listed may not be a complete list. Talk with your dietitian about what dietary choices are best for you.  Grains   Baked goods made with fat, such as croissants, muffins, or some breads. Dry pasta or rice meal packs.  Vegetables   Creamed or fried vegetables. Vegetables in a cheese sauce. Regular canned vegetables (not low-sodium or reduced-sodium). Regular canned tomato sauce and paste (not low-sodium or reduced-sodium). Regular tomato and vegetable juice (not low-sodium or reduced-sodium). Pickles. Olives.  Fruits   Canned fruit in a light or heavy syrup. Fried  fruit. Fruit in cream or butter sauce.  Meat and other protein foods   Fatty cuts of meat. Ribs. Fried meat. [place]. Sausage. Bologna and other processed lunch meats. Salami. Fatback. Hotdogs. Bratwurst. Salted nuts and seeds. Canned beans with added salt. Canned or smoked fish. Whole eggs or egg yolks. Chicken or turkey with skin.  Dairy   Whole or 2% milk, cream, and half-and-half. Whole or full-fat cream cheese. Whole-fat or sweetened yogurt. Full-fat cheese. Nondairy creamers. Whipped toppings. Processed cheese and cheese spreads.  Fats and oils   Butter. Stick margarine. Lard. Shortening. Ghee. [place] fat. Tropical oils, such as coconut, palm kernel, or palm oil.  Seasoning and other foods   Salted popcorn and pretzels. Onion salt, garlic salt, seasoned salt, table salt, and sea salt. Worcestershire sauce. Tartar sauce. Barbecue sauce. Teriyaki sauce. Soy sauce, including reduced-sodium. Steak sauce. Canned and packaged gravies. Fish sauce. Oyster sauce. Cocktail sauce. Horseradish that you find on the shelf. Ketchup. Mustard. Meat flavorings and tenderizers. Bouillon cubes. Hot sauce and Tabasco sauce. Premade or packaged marinades. Premade or packaged taco seasonings. Relishes. Regular salad dressings.  Where to find more information:  · National Heart, Lung, and [employer] [place]: www.nhlbi.nih.gov  · American Heart Association: www.heart.org  Summary  · The DASH eating plan is a healthy eating plan that has been shown to reduce high blood pressure (hypertension). It may also reduce your risk for type 2 diabetes, heart disease, and stroke.  · With the DASH eating plan, you should limit salt (sodium) intake to 2,300 mg a day. If you have hypertension, you may need to reduce your sodium intake to 1,500 mg a day.  · When on the DASH eating plan, aim to eat more fresh fruits and vegetables, whole grains, lean proteins, low-fat dairy, and heart-healthy fats.  · Work with your health care provider or diet and  nutrition specialist (dietitian) to adjust your eating plan to your individual calorie needs.  This information is not intended to replace advice given to you by your health care provider. Make sure you discuss any questions you have with your health care provider.  Document Released: 12/06/2012 Document Revised: 12/11/2017 Document Reviewed: 12/11/2017  [employer] Interactive Patient Education © 2017 [employer]

## 2018-05-29 RX ORDER — ATORVASTATIN CALCIUM 40 MG/1
TABLET, FILM COATED ORAL
Qty: 30 TABLET | Refills: 0 | OUTPATIENT
Start: 2018-05-29

## 2018-06-06 ENCOUNTER — TELEPHONE (OUTPATIENT)
Dept: CARDIOLOGY | Facility: CLINIC | Age: 73
End: 2018-06-06

## 2018-06-06 RX ORDER — ATORVASTATIN CALCIUM 40 MG/1
40 TABLET, FILM COATED ORAL DAILY
Qty: 90 TABLET | Refills: 3 | Status: SHIPPED | OUTPATIENT
Start: 2018-06-06 | End: 2019-01-02 | Stop reason: SDUPTHER

## 2018-06-06 NOTE — TELEPHONE ENCOUNTER
Spoke with daughter, Pam. Clarified that lipitor dose is 40 mg. Refills sent to [facility] in [place].

## 2019-01-02 ENCOUNTER — OFFICE VISIT (OUTPATIENT)
Dept: CARDIOLOGY | Facility: CLINIC | Age: 74
End: 2019-01-02

## 2019-01-02 VITALS
BODY MASS INDEX: 36.13 KG/M2 | HEART RATE: 64 BPM | DIASTOLIC BLOOD PRESSURE: 60 MMHG | SYSTOLIC BLOOD PRESSURE: 106 MMHG | HEIGHT: 60 IN

## 2019-01-02 DIAGNOSIS — R60.0 EDEMA OF BOTH LOWER EXTREMITIES: ICD-10-CM

## 2019-01-02 DIAGNOSIS — I25.9 IHD (ISCHEMIC HEART DISEASE): Primary | ICD-10-CM

## 2019-01-02 DIAGNOSIS — I73.9 PVD (PERIPHERAL VASCULAR DISEASE) (HCC): ICD-10-CM

## 2019-01-02 DIAGNOSIS — I10 ESSENTIAL HYPERTENSION: ICD-10-CM

## 2019-01-02 DIAGNOSIS — E78.00 PURE HYPERCHOLESTEROLEMIA: ICD-10-CM

## 2019-01-02 PROCEDURE — 99213 OFFICE O/P EST LOW 20 MIN: CPT | Performed by: NURSE PRACTITIONER

## 2019-01-02 RX ORDER — ATORVASTATIN CALCIUM 40 MG/1
40 TABLET, FILM COATED ORAL DAILY
Qty: 90 TABLET | Refills: 3 | Status: SHIPPED | OUTPATIENT
Start: 2019-01-02 | End: 2019-08-19 | Stop reason: SDUPTHER

## 2019-01-02 RX ORDER — POTASSIUM CHLORIDE 20 MEQ/1
20 TABLET, EXTENDED RELEASE ORAL 2 TIMES DAILY
Qty: 180 TABLET | Refills: 2 | Status: SHIPPED | OUTPATIENT
Start: 2019-01-02 | End: 2020-01-02

## 2019-01-02 RX ORDER — RANOLAZINE 1000 MG/1
1 TABLET, EXTENDED RELEASE ORAL EVERY 12 HOURS
Qty: 180 TABLET | Refills: 2 | Status: SHIPPED | OUTPATIENT
Start: 2019-01-02 | End: 2019-08-19 | Stop reason: SDUPTHER

## 2019-01-02 RX ORDER — LISINOPRIL 10 MG/1
10 TABLET ORAL DAILY
COMMUNITY
End: 2019-01-02 | Stop reason: SDUPTHER

## 2019-01-02 RX ORDER — LISINOPRIL 10 MG/1
10 TABLET ORAL DAILY
Qty: 90 TABLET | Refills: 2 | Status: SHIPPED | OUTPATIENT
Start: 2019-01-02 | End: 2019-08-19 | Stop reason: SDUPTHER

## 2019-01-02 RX ORDER — GABAPENTIN 300 MG/1
300 CAPSULE ORAL 3 TIMES DAILY
COMMUNITY

## 2019-01-02 RX ORDER — FUROSEMIDE 20 MG/1
TABLET ORAL
Qty: 180 TABLET | Refills: 2 | Status: SHIPPED | OUTPATIENT
Start: 2019-01-02 | End: 2019-08-19 | Stop reason: ALTCHOICE

## 2019-01-02 RX ORDER — NITROGLYCERIN 120 MG/1
1 PATCH TRANSDERMAL DAILY
Qty: 90 PATCH | Refills: 2 | Status: SHIPPED | OUTPATIENT
Start: 2019-01-02 | End: 2019-08-19 | Stop reason: SDUPTHER

## 2019-01-02 RX ORDER — ALPRAZOLAM 0.25 MG/1
0.25 TABLET ORAL 3 TIMES DAILY
COMMUNITY

## 2019-01-02 NOTE — PROGRESS NOTES
"Chief Complaint   Patient presents with   • Follow-up     for cardiac management   • Med Refill     refills needed on all cardiac meds.  90 days to [facility]   • Labs     PCP checked labs in October   • Chest Pain     has some random chest pain \" same as I've always had\"   • New medication     Lisinopril has been added since last visit, was having a lot of knee pain. Doing better now.        Subjective       Ana Lenz is a 73 y.o. female  with hypertension, hyperlipidemia, PVD, and IHD s/p bypass surgery and LA clipping in March 2016.  She also has a history of CVA and was treated with Coumadin until clipping.  Stress test done in October 2016 showed small lateral ischemia with medical management and increase in Imdur then later changed to nitro patch secondary to GI discomfort  She then developed more chest pain and leg pain, ischemic ulcer on left foot with swelling, L>R, and was admitted to the hospital.  Her stress test showed anteroseptal infarct with mild tanya-infarct ischemia and LVEF 53%.  She then underwent arterial ultrasound which showed significant abnormality followed by peripheral angiogram with angioplasty and stenting of the left mid and distal superficial femoral artery on 8/16/17.  She was discharged with Plavix. In March 2018, she presented to the ER with chest pain and significantly elevated bp.  MI was ruled out. Echo and stress showed anterior wall ischemia with EF around 50%.  Cardiac cath showed worsening left main disease and disease of circumflex which were stented.  At discharge, metoprolol was reduced to 12.5 mg BID for bradycardia, and pravastatin changed to Lipitor.  CXR showed rounded, mass-like density in the left lung with further evaluation recommended. History is notable for histoplasmosis.  Arterial doppler showed several distal vascular disease. At hospital follow up, the dose of aspirin was increased.     Today she comes to the office for a follow up visit. She admits to " symptoms of chronic angina, however, not as intense or as often since most recent cath with stenting. Shortness of breath and swelling of her feet and legs is worse some days for which she takes an extra dose of Lasix with benefit.     HPI     Cardiac History:    Past Surgical History:   Procedure Laterality Date   • CARDIAC CATHETERIZATION  10/22/2003    non-critical disease of LAD and RCA   • CARDIAC CATHETERIZATION  12/06/2006    sig calcification. IVUS showed no sig lesions   • CARDIAC CATHETERIZATION  02/26/2016    LM 60% (IVUS) 90% LAD, 70% RCA   • CARDIAC CATHETERIZATION  03/28/2018    100% SVG to D1. 95% LM 4.0x12. 75% LCX- 3.0x28 & 3.5x8 Promus   • CARDIOVASCULAR STRESS TEST  03/25/2008    D. Myoview- 53% THR, baseline LBBB, R/O ischemia   • CARDIOVASCULAR STRESS TEST  05/30/2013    L Myoview (LBBB) negative    • CARDIOVASCULAR STRESS TEST  11/09/2015    L. Myoview- (LBBB) Negative    • CARDIOVASCULAR STRESS TEST  10/26/2016    Lexiscan-small lateral wall ischemia, increase Imdur, cath is symptoms persist   • CARDIOVASCULAR STRESS TEST  03/26/2018    @[facility]. Dr. Cabrera. SARITHA Myoview- Anterior Ischemia   • CONVERTED (HISTORICAL) HOLTER  08/12/2008    AVG HR 88 bpm, LBBB, rare PVC's and PAC's   • CORONARY ARTERY BYPASS GRAFT  03/18/2016    (Dr. Hernandez) LIMA-LAD, SVG-D1, SVG-PDA, LA clipping   • ECHO - CONVERTED  03/25/2008    EF >60%, LBBB   • ECHO - CONVERTED  01/31/2012    EF 60-65%, RVSP 41 mmHg   • ECHO - CONVERTED  05/30/2013    EF 60-65%, RVSP 44 mmHg   • ECHO - CONVERTED  11/09/2015    EF 65%. RVSP- 50 mmHg    • ECHO - CONVERTED  03/27/2018    @[facility]. EF 60%   • PERIPHERAL ARTERIAL STENT GRAFT  08/16/2017    Angioplasty/stenting 6mm x 120mm EverFlex stent of L mid and distal superficial femoral artery    • US ARTERIAL DOPPLER LOWER EXTREMITY  UNILATERAL  08/11/2017    abnormal flow- significant PVD   • US CAROTID UNILATERAL  09/03/2008    <49% stenosis bilat   • US CAROTID UNILATERAL  11/18/2015    normal        Current Outpatient Medications   Medication Sig Dispense Refill   • ALPRAZolam (XANAX) 0.25 MG tablet Take 0.25 mg by mouth 3 (Three) Times a Day As Needed for Anxiety.     • aspirin (RA ASPIRIN EC) 81 MG EC tablet Increase to twice daily 60 tablet 11   • atorvastatin (LIPITOR) 40 MG tablet Take 1 tablet by mouth Daily. 90 tablet 3   • cholecalciferol (VITAMIN D3) 1000 UNITS tablet Take 1,000 Units by mouth 2 (Two) Times a Day.     • clopidogrel (PLAVIX) 75 MG tablet Take 1 tablet by mouth Daily. 30 tablet 7   • ferrous sulfate 325 (65 FE) MG tablet Take 325 mg by mouth Daily With Breakfast.     • furosemide (LASIX) 20 MG tablet Increase to one tablet once a day and one extra tablet as needed for increased swelling and shortness of breath 180 tablet 2   • gabapentin (NEURONTIN) 300 MG capsule Take 300 mg by mouth 3 (Three) Times a Day.     • lisinopril (PRINIVIL,ZESTRIL) 10 MG tablet Take 1 tablet by mouth Daily. 90 tablet 2   • metoprolol tartrate (LOPRESSOR) 25 MG tablet 1/2 tablet twice a day 90 tablet 2   • nitroglycerin (NITRODUR) 0.6 MG/HR patch Place 1 patch on the skin as directed by provider Daily. 90 patch 2   • nitroglycerin (NITROSTAT) 0.4 MG SL tablet 1 under the tongue as needed for angina, may repeat q5mins for up three doses 100 tablet 11   • O2 (OXYGEN) 1.5 liters PRN     • oxyCODONE-acetaminophen (PERCOCET) 5-325 MG per tablet Take 1 tablet by mouth Every 8 (Eight) Hours As Needed.     • potassium chloride (K-DUR,KLOR-CON) 20 MEQ CR tablet Take 1 tablet by mouth 2 (Two) Times a Day. 180 tablet 2   • ranolazine (RANEXA) 1000 MG 12 hr tablet Take 1 tablet by mouth Every 12 (Twelve) Hours. 180 tablet 2     No current facility-administered medications for this visit.        Morphine and related and Prednisone    Past Medical History:   Diagnosis Date   • Chest pain    • DDD (degenerative disc disease), lumbar    • H/O: hysterectomy    • Hx of appendectomy    • Hx of cholecystectomy    • IHD  "(ischemic heart disease)    • LBBB (left bundle branch block)    • Palpitations    • S/P cataract surgery     left   • SOB (shortness of breath)        Social History     Socioeconomic History   • Marital status:      Spouse name: Not on file   • Number of children: Not on file   • Years of education: Not on file   • Highest education level: Not on file   Social Needs   • Financial resource strain: Not on file   • Food insecurity - worry: Not on file   • Food insecurity - inability: Not on file   • Transportation needs - medical: Not on file   • Transportation needs - non-medical: Not on file   Occupational History   • Not on file   Tobacco Use   • Smoking status: Current Every Day Smoker     Packs/day: 0.25     Types: Cigarettes   • Smokeless tobacco: Never Used   Substance and Sexual Activity   • Alcohol use: No   • Drug use: No   • Sexual activity: Not on file   Other Topics Concern   • Not on file   Social History Narrative   • Not on file       Family History   Problem Relation Age of Onset   • Heart disease Mother    • Heart disease Father    • Heart disease Other         CABG   • Heart attack Daughter    • Heart disease Daughter        Review of Systems   Constitution: Positive for weakness (\"same\"). Negative for decreased appetite.   HENT: Negative for congestion and nosebleeds.    Eyes: Negative for redness and visual disturbance.   Cardiovascular: Positive for chest pain, claudication and leg swelling (lower legs and feet, same).   Respiratory: Positive for cough and shortness of breath.    Endocrine: Negative for polydipsia, polyphagia and polyuria.   Hematologic/Lymphatic: Negative for bleeding problem. Does not bruise/bleed easily.   Skin: Negative for itching.   Musculoskeletal: Positive for muscle weakness (legs, uses wheelchair). Negative for falls.   Gastrointestinal: Negative for change in bowel habit, heartburn, melena and nausea.   Genitourinary: Negative for dysuria and hematuria. " "  Neurological: Negative for headaches and light-headedness.   Psychiatric/Behavioral: Negative for memory loss. The patient does not have insomnia.         Objective     /60   Pulse 64   Ht 152.4 cm (60\")   BMI 36.13 kg/m²     Physical Exam   Constitutional: She is oriented to person, place, and time. Vital signs are normal. She appears well-nourished. No distress.   HENT:   Head: Normocephalic.   Eyes: Conjunctivae are normal. Pupils are equal, round, and reactive to light.   Neck: Normal range of motion. Neck supple. Carotid bruit is not present.   Cardiovascular: Normal rate, regular rhythm, S1 normal and S2 normal.   No murmur heard.  Pulses:       Radial pulses are 2+ on the right side, and 2+ on the left side.   Slightly loud S2   Pulmonary/Chest: She has wheezes.   Abdominal: Soft. Bowel sounds are normal. There is no tenderness.   Musculoskeletal: Normal range of motion. She exhibits edema (1-2+ ankles/feet).   Neurological: She is alert and oriented to person, place, and time.   Skin: Skin is warm and dry.   Psychiatric: She has a normal mood and affect.      Procedures: none today      Assessment/Plan      Ana was seen today for follow-up, med refill, labs, chest pain and new medication.    Diagnoses and all orders for this visit:    IHD (ischemic heart disease)    Edema of both lower extremities    Pure hypercholesterolemia    Essential hypertension    PVD (peripheral vascular disease) (CMS/[facility])    Other orders  -     furosemide (LASIX) 20 MG tablet; Increase to one tablet once a day and one extra tablet as needed for increased swelling and shortness of breath  -     ranolazine (RANEXA) 1000 MG 12 hr tablet; Take 1 tablet by mouth Every 12 (Twelve) Hours.  -     potassium chloride (K-DUR,KLOR-CON) 20 MEQ CR tablet; Take 1 tablet by mouth 2 (Two) Times a Day.  -     nitroglycerin (NITRODUR) 0.6 MG/HR patch; Place 1 patch on the skin as directed by provider Daily.  -     metoprolol tartrate " (LOPRESSOR) 25 MG tablet; 1/2 tablet twice a day  -     lisinopril (PRINIVIL,ZESTRIL) 10 MG tablet; Take 1 tablet by mouth Daily.  -     atorvastatin (LIPITOR) 40 MG tablet; Take 1 tablet by mouth Daily.    Her blood pressure, heart rate and rhythm are normal today. She admits to chronic angina which she reports has improved since most recent placement of stents. Same cardiac medications advised and refills given.     For swelling of lower legs, she takes Lasix and potassium bid with benefit. Advised to continue the same.     She will follow with you for lab orders/management. Please forward a copy of lab results as available.     Patient's Body mass index is 36.13 kg/m². BMI is above normal parameters. Recommendations include: nutrition counseling. Heart healthy/DASH diet encouraged.      I advised Ana of the risks of continuing to use tobacco, and I provided her with tobacco cessation educational materials in the After Visit Summary.   During this visit, I spent < 3 minutes counseling the patient regarding tobacco cessation.  At this time, she does not feel she can stop smoking.     From a cardiac standpoint, Ana appears stable today. I did not order cardiac testing. Should symptoms worsen, she understands to call or go to ER. She reports nitrostat is up to date.     A 6 month follow up visit scheduled.          Electronically signed by JOSE Dean,  January 2, 2019 2:46 PM

## 2019-05-03 ENCOUNTER — OUTSIDE FACILITY SERVICE (OUTPATIENT)
Dept: CARDIOLOGY | Facility: CLINIC | Age: 74
End: 2019-05-03

## 2019-05-03 PROCEDURE — 93306 TTE W/DOPPLER COMPLETE: CPT | Performed by: INTERNAL MEDICINE

## 2019-05-13 RX ORDER — CLOPIDOGREL BISULFATE 75 MG/1
75 TABLET ORAL DAILY
Qty: 30 TABLET | Refills: 3 | Status: SHIPPED | OUTPATIENT
Start: 2019-05-13 | End: 2019-08-19 | Stop reason: SDUPTHER

## 2019-08-19 ENCOUNTER — OFFICE VISIT (OUTPATIENT)
Dept: CARDIOLOGY | Facility: CLINIC | Age: 74
End: 2019-08-19

## 2019-08-19 VITALS
HEIGHT: 60 IN | HEART RATE: 60 BPM | BODY MASS INDEX: 36.13 KG/M2 | SYSTOLIC BLOOD PRESSURE: 140 MMHG | DIASTOLIC BLOOD PRESSURE: 70 MMHG

## 2019-08-19 DIAGNOSIS — I25.9 IHD (ISCHEMIC HEART DISEASE): Primary | ICD-10-CM

## 2019-08-19 DIAGNOSIS — I10 ESSENTIAL HYPERTENSION: ICD-10-CM

## 2019-08-19 DIAGNOSIS — I73.9 PVD (PERIPHERAL VASCULAR DISEASE) (HCC): ICD-10-CM

## 2019-08-19 DIAGNOSIS — R60.0 EDEMA LEG: ICD-10-CM

## 2019-08-19 DIAGNOSIS — E78.00 PURE HYPERCHOLESTEROLEMIA: ICD-10-CM

## 2019-08-19 DIAGNOSIS — I25.118 CORONARY ARTERY DISEASE OF NATIVE ARTERY OF NATIVE HEART WITH STABLE ANGINA PECTORIS (HCC): ICD-10-CM

## 2019-08-19 DIAGNOSIS — Z72.0 TOBACCO ABUSE: ICD-10-CM

## 2019-08-19 PROCEDURE — 99214 OFFICE O/P EST MOD 30 MIN: CPT | Performed by: NURSE PRACTITIONER

## 2019-08-19 RX ORDER — LISINOPRIL 10 MG/1
10 TABLET ORAL DAILY
Qty: 90 TABLET | Refills: 3 | Status: SHIPPED | OUTPATIENT
Start: 2019-08-19 | End: 2020-06-16

## 2019-08-19 RX ORDER — CLOPIDOGREL BISULFATE 75 MG/1
75 TABLET ORAL DAILY
Qty: 90 TABLET | Refills: 3 | Status: SHIPPED | OUTPATIENT
Start: 2019-08-19 | End: 2020-02-17 | Stop reason: SDUPTHER

## 2019-08-19 RX ORDER — ISOSORBIDE MONONITRATE 30 MG/1
30 TABLET, EXTENDED RELEASE ORAL EVERY MORNING
Qty: 90 TABLET | Refills: 3 | Status: SHIPPED | OUTPATIENT
Start: 2019-08-19 | End: 2020-02-17 | Stop reason: SDUPTHER

## 2019-08-19 RX ORDER — RANOLAZINE 1000 MG/1
1 TABLET, EXTENDED RELEASE ORAL EVERY 12 HOURS
Qty: 180 TABLET | Refills: 3 | Status: SHIPPED | OUTPATIENT
Start: 2019-08-19 | End: 2020-01-09 | Stop reason: SDUPTHER

## 2019-08-19 RX ORDER — NITROGLYCERIN 120 MG/1
1 PATCH TRANSDERMAL DAILY
Qty: 90 PATCH | Refills: 2 | Status: SHIPPED | OUTPATIENT
Start: 2019-08-19 | End: 2020-02-17 | Stop reason: SDUPTHER

## 2019-08-19 RX ORDER — ASPIRIN 81 MG/1
TABLET ORAL
Qty: 180 TABLET | Refills: 3 | Status: SHIPPED | OUTPATIENT
Start: 2019-08-19 | End: 2020-02-17 | Stop reason: SDUPTHER

## 2019-08-19 RX ORDER — FUROSEMIDE 40 MG/1
40 TABLET ORAL DAILY
COMMUNITY

## 2019-08-19 RX ORDER — ATORVASTATIN CALCIUM 40 MG/1
40 TABLET, FILM COATED ORAL DAILY
Qty: 90 TABLET | Refills: 3 | Status: SHIPPED | OUTPATIENT
Start: 2019-08-19 | End: 2020-08-24

## 2019-08-19 NOTE — PROGRESS NOTES
Chief Complaint   Patient presents with   • Follow-up     Cardiac management. In [facility] in May due to kidney failure, had reaction to Bactrim.   • Chest Pain     Having random sharp mid to left chest pains, short in duration. She has not took any Nitro SL.    • Palpitations     Notices more when she lays down at night.   • Lab     Last labs 1-2 months ago per PCP.   • Med Refill     Needs refills on cardiac medication-90 day.       Subjective       Ana Lenz is a 73 y.o. female with HTN, hyperlipidemia, PVD, and IHD s/p bypass surgery and LA clipping in March 2016.  She also has a history of CVA treated with Coumadin until clipping.  She then developed more leg pain, ischemic ulcer on left foot. She was admitted to the hospital.  Stress test showed anteroseptal infarct with mild tanya-infarct ischemia and LVEF 53%.  She underwent arterial ultrasound which showed significant abnormality followed by peripheral angiogram with angioplasty and stenting of the left mid and distal superficial femoral artery on 8/16/17.  She was discharged with Plavix. In March 2018, she presented to the ER with chest pain and significantly elevated bp.  MI was ruled out. Echo and stress showed anterior wall ischemia with EF around 50%.  Cardiac cath showed worsening left main disease and disease of circumflex which were stented.  At discharge, metoprolol was reduced to 12.5 mg BID for bradycardia, and pravastatin changed to Lipitor.  CXR showed rounded, mass-like density in the left lung with further evaluation recommended. History is notable for histoplasmosis.  Arterial doppler showed several distal vascular stenoses. At hospital follow up, the dose of aspirin was increased.     She came today for routine follow up. Continues to have mild, intermittent sharp chest pain. She was admitted to the hospital in May 2019 after having a problem with Bactrim, developed DENNY. She had an echocardiogram showing EF 65%, MV thickening without  stenosis, RVSP 48 mmHg. According to her family, labs have returned to baseline. Lasix increased to 40 mg for LE swelling. She unfortunately has been unsuccessful with smoking cessation.     HPI         Cardiac History:    Past Surgical History:   Procedure Laterality Date   • CARDIAC CATHETERIZATION  10/22/2003    non-critical disease of LAD and RCA   • CARDIAC CATHETERIZATION  12/06/2006    sig calcification. IVUS showed no sig lesions   • CARDIAC CATHETERIZATION  02/26/2016    LM 60% (IVUS) 90% LAD, 70% RCA   • CARDIAC CATHETERIZATION  03/28/2018    100% SVG to D1. 95% LM 4.0x12. 75% LCX- 3.0x28 & 3.5x8 Promus   • CARDIOVASCULAR STRESS TEST  03/25/2008    D. Myoview- 53% THR, baseline LBBB, R/O ischemia   • CARDIOVASCULAR STRESS TEST  05/30/2013    L Myoview (LBBB) negative    • CARDIOVASCULAR STRESS TEST  11/09/2015    L. Myoview- (LBBB) Negative    • CARDIOVASCULAR STRESS TEST  10/26/2016    Lexiscan-small lateral wall ischemia, increase Imdur, cath is symptoms persist   • CARDIOVASCULAR STRESS TEST  03/26/2018    @[facility]. Dr. Cabrera. L. Myoview- Anterior Ischemia   • CONVERTED (HISTORICAL) HOLTER  08/12/2008    AVG HR 88 bpm, LBBB, rare PVC's and PAC's   • CORONARY ARTERY BYPASS GRAFT  03/18/2016    (Dr. Hernandez) LIMA-LAD, SVG-D1, SVG-PDA, LA clipping   • ECHO - CONVERTED  03/25/2008    EF >60%, LBBB   • ECHO - CONVERTED  01/31/2012    EF 60-65%, RVSP 41 mmHg   • ECHO - CONVERTED  05/30/2013    EF 60-65%, RVSP 44 mmHg   • ECHO - CONVERTED  11/09/2015    EF 65%. RVSP- 50 mmHg    • ECHO - CONVERTED  03/27/2018    @[facility]. EF 60%   • ECHO - CONVERTED  05/03/2019    ([facility]) EF 65%, thickened mitral valve, RVSP 48 mmHg    • PERIPHERAL ARTERIAL STENT GRAFT  08/16/2017    Angioplasty/stenting 6mm x 120mm EverFlex stent of L mid and distal superficial femoral artery    • US ARTERIAL DOPPLER LOWER EXTREMITY  UNILATERAL  08/11/2017    abnormal flow- significant PVD   • US CAROTID UNILATERAL  09/03/2008    <49% stenosis bilat    • US CAROTID UNILATERAL  11/18/2015    normal       Current Outpatient Medications   Medication Sig Dispense Refill   • ALPRAZolam (XANAX) 0.25 MG tablet Take 0.25 mg by mouth 3 (Three) Times a Day.     • aspirin (RA ASPIRIN EC) 81 MG EC tablet Increase to twice daily 180 tablet 3   • atorvastatin (LIPITOR) 40 MG tablet Take 1 tablet by mouth Daily. 90 tablet 3   • Cholecalciferol (VITAMIN D PO) Take 10,000 Units by mouth Daily.     • clopidogrel (PLAVIX) 75 MG tablet Take 1 tablet by mouth Daily. 90 tablet 3   • ferrous sulfate 325 (65 FE) MG tablet Take 325 mg by mouth Daily With Breakfast.     • furosemide (LASIX) 40 MG tablet Take 40 mg by mouth Daily.     • gabapentin (NEURONTIN) 300 MG capsule Take 300 mg by mouth 3 (Three) Times a Day.     • lisinopril (PRINIVIL,ZESTRIL) 10 MG tablet Take 1 tablet by mouth Daily. 90 tablet 3   • metoprolol tartrate (LOPRESSOR) 25 MG tablet 1/2 tablet twice a day 90 tablet 3   • nitroglycerin (NITRODUR) 0.6 MG/HR patch Place 1 patch on the skin as directed by provider Daily. 90 patch 2   • nitroglycerin (NITROSTAT) 0.4 MG SL tablet 1 under the tongue as needed for angina, may repeat q5mins for up three doses 100 tablet 11   • O2 (OXYGEN) 1.5 liters PRN     • oxyCODONE-acetaminophen (PERCOCET) 5-325 MG per tablet Take 1 tablet by mouth Every 6 (Six) Hours.     • potassium chloride (K-DUR,KLOR-CON) 20 MEQ CR tablet Take 1 tablet by mouth 2 (Two) Times a Day. 180 tablet 2   • ranolazine (RANEXA) 1000 MG 12 hr tablet Take 1 tablet by mouth Every 12 (Twelve) Hours. 180 tablet 3   • isosorbide mononitrate (IMDUR) 30 MG 24 hr tablet Take 1 tablet by mouth Every Morning. 90 tablet 3     No current facility-administered medications for this visit.      Bactrim [sulfamethoxazole-trimethoprim]; Morphine and related; and Prednisone    Past Medical History:   Diagnosis Date   • Chest pain    • DDD (degenerative disc disease), lumbar    • H/O: hysterectomy    • Hx of appendectomy    • Hx  "of cholecystectomy    • IHD (ischemic heart disease)    • LBBB (left bundle branch block)    • Palpitations    • S/P cataract surgery     left   • SOB (shortness of breath)      Social History     Socioeconomic History   • Marital status:      Spouse name: Not on file   • Number of children: Not on file   • Years of education: Not on file   • Highest education level: Not on file   Tobacco Use   • Smoking status: Current Every Day Smoker     Packs/day: 0.50     Types: Cigarettes   • Smokeless tobacco: Never Used   Substance and Sexual Activity   • Alcohol use: No   • Drug use: No     Family History   Problem Relation Age of Onset   • Heart disease Mother    • Heart disease Father    • Heart disease Other         CABG   • Heart attack Daughter    • Heart disease Daughter      Review of Systems   Constitution: Positive for weakness. Negative for decreased appetite. Weight gain: unable to weigh, in wheelchair    HENT: Negative.    Eyes: Negative.    Cardiovascular: Positive for chest pain, claudication (doesn't walk mch, mostly transfers), dyspnea on exertion and leg swelling. Negative for palpitations and syncope.   Respiratory: Positive for cough and shortness of breath.    Endocrine: Negative.    Hematologic/Lymphatic: Negative.    Skin: Negative.    Musculoskeletal: Negative.    Gastrointestinal: Negative for abdominal pain and melena.   Genitourinary: Negative for dysuria and hematuria.   Neurological: Negative for dizziness.   Psychiatric/Behavioral: Negative for altered mental status and depression.   Allergic/Immunologic: Negative.         Diabetes- No  Thyroid-normal    Objective     /70 (BP Location: Left arm)   Pulse 60   Ht 152.4 cm (60\")   BMI 36.13 kg/m²     Physical Exam   Constitutional: She is oriented to person, place, and time. She appears well-developed and well-nourished. No distress.   Pt sitting in wheelchair    HENT:   Head: Normocephalic and atraumatic.   Eyes: Pupils are equal, " round, and reactive to light.   Neck: Normal range of motion. Neck supple.   Cardiovascular: Normal rate and regular rhythm.  No extrasystoles are present. Exam reveals decreased pulses.   Murmur heard.   Systolic murmur is present with a grade of 3/6 at the apex.  Pulmonary/Chest: Effort normal. No respiratory distress. She has decreased breath sounds. She has no wheezes.   Abdominal: Soft. Bowel sounds are normal.   Musculoskeletal: Normal range of motion. She exhibits edema (trace- 1+ bilateral LE edema ).   Neurological: She is alert and oriented to person, place, and time.   Skin: Skin is warm and dry. She is not diaphoretic.   Psychiatric: She has a normal mood and affect.   Nursing note and vitals reviewed.     Procedures          Assessment/Plan    1. IHD- Intermittent chest pain. Continue Nitro patch, Ranexa for chronic, stable angina. Add Imdur 30 mg QD. Monitor for toleration, previously reported mild nausea. Declines ischemic work up/stress test.  2. HTN- well controlled, continue metoprolol, lisinopril. Sodium restriction, weight loss.   3. Hyperlipidemia- well controlled. 2018- LDL 60 HDL 43, continue Lipitor. Normal LFT.  4. PVD- Weak PP, mild claudication, no ischemic lesions. Continue Plavix, aspirin, statin. Strong advice on smoking cessation.  5. Tobacco abuse- unwilling to consider quitting at this time.   6. Edema LE- increased diuretic, improved. Will try to obtain labs. Uses compression stockings PRN. Elevate legs, limit sodium 1500 mg daily.     She is advised to monitor anginal symptoms after adding PO long-acting nitrates. If nausea develops, advised to discontinue. Use sl PRN. Advised to present to ER for significant chest pain. Could we get copy of most recent labs done in your office? Refills sent. We will see her back in six months.     Ana was seen today for follow-up, chest pain, palpitations, lab and med refill.    Diagnoses and all orders for this visit:    IHD (ischemic heart  disease)  -     isosorbide mononitrate (IMDUR) 30 MG 24 hr tablet; Take 1 tablet by mouth Every Morning.  -     ranolazine (RANEXA) 1000 MG 12 hr tablet; Take 1 tablet by mouth Every 12 (Twelve) Hours.  -     nitroglycerin (NITRODUR) 0.6 MG/HR patch; Place 1 patch on the skin as directed by provider Daily.    Essential hypertension  -     lisinopril (PRINIVIL,ZESTRIL) 10 MG tablet; Take 1 tablet by mouth Daily.  -     metoprolol tartrate (LOPRESSOR) 25 MG tablet; 1/2 tablet twice a day    Pure hypercholesterolemia  -     atorvastatin (LIPITOR) 40 MG tablet; Take 1 tablet by mouth Daily.    PVD (peripheral vascular disease) (CMS/[facility])  -     clopidogrel (PLAVIX) 75 MG tablet; Take 1 tablet by mouth Daily.  -     aspirin (RA ASPIRIN EC) 81 MG EC tablet; Increase to twice daily    Tobacco abuse    Coronary artery disease of native artery of native heart with stable angina pectoris (CMS/[facility])  -     isosorbide mononitrate (IMDUR) 30 MG 24 hr tablet; Take 1 tablet by mouth Every Morning.  -     ranolazine (RANEXA) 1000 MG 12 hr tablet; Take 1 tablet by mouth Every 12 (Twelve) Hours.  -     metoprolol tartrate (LOPRESSOR) 25 MG tablet; 1/2 tablet twice a day  -     nitroglycerin (NITRODUR) 0.6 MG/HR patch; Place 1 patch on the skin as directed by provider Daily.  -     clopidogrel (PLAVIX) 75 MG tablet; Take 1 tablet by mouth Daily.  -     aspirin (RA ASPIRIN EC) 81 MG EC tablet; Increase to twice daily    Edema leg        Patient's Body mass index is 36.13 kg/m². BMI is above normal parameters. Recommendations include: nutrition counseling.       Ana Lenz is a current cigarettes user.  She currently smokes 1 pack of cigarettes and packs of cigarettes per day for a duration of 50 years. I have educated her on the risk of diseases from using tobacco products such as cancer, COPD and heart diease.     I advised her to quit and she is not willing to quit.    I spent 3  minutes counseling the patient.                      Electronically signed by JOSE Dominguez,  August 22, 2019 5:35 AM

## 2019-08-22 PROBLEM — I25.118 CORONARY ARTERY DISEASE OF NATIVE ARTERY OF NATIVE HEART WITH STABLE ANGINA PECTORIS (HCC): Status: ACTIVE | Noted: 2019-08-22

## 2020-01-02 RX ORDER — POTASSIUM CHLORIDE 20 MEQ/1
TABLET, EXTENDED RELEASE ORAL
Qty: 180 TABLET | Refills: 2 | Status: SHIPPED | OUTPATIENT
Start: 2020-01-02

## 2020-01-09 ENCOUNTER — TELEPHONE (OUTPATIENT)
Dept: CARDIOLOGY | Facility: CLINIC | Age: 75
End: 2020-01-09

## 2020-01-09 DIAGNOSIS — I25.118 CORONARY ARTERY DISEASE OF NATIVE ARTERY OF NATIVE HEART WITH STABLE ANGINA PECTORIS (HCC): ICD-10-CM

## 2020-01-09 DIAGNOSIS — I25.9 IHD (ISCHEMIC HEART DISEASE): ICD-10-CM

## 2020-01-09 RX ORDER — RANOLAZINE 1000 MG/1
1 TABLET, EXTENDED RELEASE ORAL EVERY 12 HOURS
Qty: 180 TABLET | Refills: 3 | Status: SHIPPED | OUTPATIENT
Start: 2020-01-09 | End: 2020-02-17 | Stop reason: SDUPTHER

## 2020-01-09 NOTE — TELEPHONE ENCOUNTER
Received letter from [facility] stating that Ranexa is not covered and wanting to change to generic Ranolazine.  Is it ok to change to Ranolazine?

## 2020-02-17 ENCOUNTER — OFFICE VISIT (OUTPATIENT)
Dept: CARDIOLOGY | Facility: CLINIC | Age: 75
End: 2020-02-17

## 2020-02-17 VITALS
DIASTOLIC BLOOD PRESSURE: 64 MMHG | SYSTOLIC BLOOD PRESSURE: 120 MMHG | HEIGHT: 60 IN | HEART RATE: 68 BPM | BODY MASS INDEX: 36.13 KG/M2

## 2020-02-17 DIAGNOSIS — I20.8 ATYPICAL ANGINA (HCC): ICD-10-CM

## 2020-02-17 DIAGNOSIS — M25.562 ARTHRALGIA OF BOTH KNEES: ICD-10-CM

## 2020-02-17 DIAGNOSIS — E78.00 PURE HYPERCHOLESTEROLEMIA: ICD-10-CM

## 2020-02-17 DIAGNOSIS — I25.9 IHD (ISCHEMIC HEART DISEASE): Primary | ICD-10-CM

## 2020-02-17 DIAGNOSIS — I25.118 CORONARY ARTERY DISEASE OF NATIVE ARTERY OF NATIVE HEART WITH STABLE ANGINA PECTORIS (HCC): ICD-10-CM

## 2020-02-17 DIAGNOSIS — I20.8 ANGINAL EQUIVALENT (HCC): ICD-10-CM

## 2020-02-17 DIAGNOSIS — M25.561 ARTHRALGIA OF BOTH KNEES: ICD-10-CM

## 2020-02-17 DIAGNOSIS — E66.9 OBESITY (BMI 30-39.9): ICD-10-CM

## 2020-02-17 DIAGNOSIS — Z72.0 TOBACCO ABUSE: ICD-10-CM

## 2020-02-17 DIAGNOSIS — Z86.73 HISTORY OF CVA (CEREBROVASCULAR ACCIDENT): ICD-10-CM

## 2020-02-17 DIAGNOSIS — I10 ESSENTIAL HYPERTENSION: ICD-10-CM

## 2020-02-17 DIAGNOSIS — R06.02 SHORTNESS OF BREATH: ICD-10-CM

## 2020-02-17 PROCEDURE — 99214 OFFICE O/P EST MOD 30 MIN: CPT | Performed by: NURSE PRACTITIONER

## 2020-02-17 RX ORDER — ASPIRIN 81 MG/1
TABLET ORAL
Qty: 180 TABLET | Refills: 3 | Status: SHIPPED | OUTPATIENT
Start: 2020-02-17

## 2020-02-17 RX ORDER — ISOSORBIDE MONONITRATE 30 MG/1
30 TABLET, EXTENDED RELEASE ORAL EVERY MORNING
Qty: 90 TABLET | Refills: 3 | Status: SHIPPED | OUTPATIENT
Start: 2020-02-17

## 2020-02-17 RX ORDER — NITROGLYCERIN 120 MG/1
1 PATCH TRANSDERMAL DAILY
Qty: 90 PATCH | Refills: 2 | Status: SHIPPED | OUTPATIENT
Start: 2020-02-17 | End: 2020-06-24

## 2020-02-17 RX ORDER — RANOLAZINE 1000 MG/1
1 TABLET, EXTENDED RELEASE ORAL EVERY 12 HOURS
Qty: 180 TABLET | Refills: 3 | Status: SHIPPED | OUTPATIENT
Start: 2020-02-17

## 2020-02-17 RX ORDER — CLOPIDOGREL BISULFATE 75 MG/1
75 TABLET ORAL DAILY
Qty: 90 TABLET | Refills: 3 | Status: SHIPPED | OUTPATIENT
Start: 2020-02-17

## 2020-02-17 NOTE — PROGRESS NOTES
Chief Complaint   Patient presents with   • Follow-up     For cardiac management. Patient is on aspirin. Last lab work was done about 3 months ago per PCP, not in chart. Reports that she has had chest pain, sometimes it is sharp pain and sometimes it feels like a burning pain. Reports that she does occasionally have palpitations.    • Med Refill     Needs refills on cardiac medications. 90 day supplies to [facility] in [place]. Went over medication list with visit.        Cardiac Complaints  chest pressure/discomfort, dyspnea and palpitations      Subjective   Ana Lenz is a 74 y.o. female with HTN, hyperlipidemia, PVD, chronic angina, and IHD s/p bypass surgery and LA clipping in March 2016.  She also has a history of CVA treated with Coumadin until clipping.  She then developed more leg pain, ischemic ulcer on left foot. She was admitted to the hospital.  Stress test showed anteroseptal infarct with mild tanya-infarct ischemia and LVEF 53%.  She underwent arterial ultrasound which showed significant abnormality followed by peripheral angiogram with angioplasty and stenting of the left mid and distal superficial femoral artery on 8/16/17.  She was discharged with Plavix. In March 2018, she presented to the ER with chest pain and significantly elevated bp.  MI was ruled out. Echo and stress showed anterior wall ischemia with EF around 50%. Cardiac cath showed worsening left main disease and disease of circumflex which were stented.  At discharge, metoprolol was reduced to 12.5 mg BID for bradycardia, and pravastatin changed to Lipitor.  CXR showed rounded, mass-like density in the left lung with further evaluation recommended. History is notable for histoplasmosis.  Arterial doppler showed several distal vascular stenoses. At hospital follow up, the dose of aspirin was increased.     She returns today for follow up and admits to more issues with burning and sharp/shooting chest pain. She states that chest  pain is coming both with and without exertion. She does feel at times it is a different pain and presents itself as different than before. She admits to continued use of both ranexa, imdur, and NTG patches in regards. She does have shortness of breath but denies it is worsening.  She continues to use oxygen as needed.She states she remains fairly inactive due to joint pain. Palpitations noted as rare. TIA symptoms denied. Refills of cardiac meds requested. Labs are followed by PCP, no current available. Unfortunately, she is still smoking despite concerns.        Cardiac History  Past Surgical History:   Procedure Laterality Date   • CARDIAC CATHETERIZATION  10/22/2003    non-critical disease of LAD and RCA   • CARDIAC CATHETERIZATION  12/06/2006    sig calcification. IVUS showed no sig lesions   • CARDIAC CATHETERIZATION  02/26/2016    LM 60% (IVUS) 90% LAD, 70% RCA   • CARDIAC CATHETERIZATION  03/28/2018    100% SVG to D1. 95% LM 4.0x12. 75% LCX- 3.0x28 & 3.5x8 Promus   • CARDIOVASCULAR STRESS TEST  03/25/2008    D. Myoview- 53% THR, baseline LBBB, R/O ischemia   • CARDIOVASCULAR STRESS TEST  05/30/2013    L Myoview (LBBB) negative    • CARDIOVASCULAR STRESS TEST  11/09/2015    L. Myoview- (LBBB) Negative    • CARDIOVASCULAR STRESS TEST  10/26/2016    Lexiscan-small lateral wall ischemia, increase Imdur, cath is symptoms persist   • CARDIOVASCULAR STRESS TEST  03/26/2018    @[email or web address]. Dr. Cabrera. SARITHA Myoview- Anterior Ischemia   • CONVERTED (HISTORICAL) HOLTER  08/12/2008    AVG HR 88 bpm, LBBB, rare PVC's and PAC's   • CORONARY ARTERY BYPASS GRAFT  03/18/2016    (Dr. Hernandez) LIMA-LAD, SVG-D1, SVG-PDA, LA clipping   • ECHO - CONVERTED  03/25/2008    EF >60%, LBBB   • ECHO - CONVERTED  01/31/2012    EF 60-65%, RVSP 41 mmHg   • ECHO - CONVERTED  05/30/2013    EF 60-65%, RVSP 44 mmHg   • ECHO - CONVERTED  11/09/2015    EF 65%. RVSP- 50 mmHg    • ECHO - CONVERTED  03/27/2018    @[email or web address]. EF 60%   • ECHO - CONVERTED  05/03/2019      ([facility]) EF 65%, thickened mitral valve, RVSP 48 mmHg    • PERIPHERAL ARTERIAL STENT GRAFT  08/16/2017    Angioplasty/stenting 6mm x 120mm EverFlex stent of L mid and distal superficial femoral artery    • US ARTERIAL DOPPLER LOWER EXTREMITY  UNILATERAL  08/11/2017    abnormal flow- significant PVD   • US CAROTID UNILATERAL  09/03/2008    <49% stenosis bilat   • US CAROTID UNILATERAL  11/18/2015    normal       Current Outpatient Medications   Medication Sig Dispense Refill   • ALPRAZolam (XANAX) 0.25 MG tablet Take 0.25 mg by mouth 3 (Three) Times a Day.     • aspirin (RA ASPIRIN EC) 81 MG EC tablet Increase to twice daily 180 tablet 3   • atorvastatin (LIPITOR) 40 MG tablet Take 1 tablet by mouth Daily. 90 tablet 3   • Cholecalciferol (VITAMIN D PO) Take 10,000 Units by mouth Daily.     • clopidogrel (PLAVIX) 75 MG tablet Take 1 tablet by mouth Daily. 90 tablet 3   • ferrous sulfate 325 (65 FE) MG tablet Take 325 mg by mouth Daily With Breakfast.     • furosemide (LASIX) 40 MG tablet Take 40 mg by mouth Daily.     • gabapentin (NEURONTIN) 300 MG capsule Take 300 mg by mouth 3 (Three) Times a Day.     • isosorbide mononitrate (IMDUR) 30 MG 24 hr tablet Take 1 tablet by mouth Every Morning. 90 tablet 3   • lisinopril (PRINIVIL,ZESTRIL) 10 MG tablet Take 1 tablet by mouth Daily. 90 tablet 3   • metoprolol tartrate (LOPRESSOR) 25 MG tablet 1/2 tablet twice a day 90 tablet 3   • nitroglycerin (NITRODUR) 0.6 MG/HR patch Place 1 patch on the skin as directed by provider Daily. 90 patch 2   • nitroglycerin (NITROSTAT) 0.4 MG SL tablet 1 under the tongue as needed for angina, may repeat q5mins for up three doses 100 tablet 11   • O2 (OXYGEN) 1.5 liters PRN     • oxyCODONE-acetaminophen (PERCOCET) 5-325 MG per tablet Take 1 tablet by mouth Every 6 (Six) Hours.     • potassium chloride (K-DUR,KLOR-CON) 20 MEQ CR tablet TAKE 1 TABLET BY MOUTH TWICE DAILY 180 tablet 2   • ranolazine (RANEXA) 1000 MG 12 hr tablet Take 1 tablet  by mouth Every 12 (Twelve) Hours. 180 tablet 3     No current facility-administered medications for this visit.        Bactrim [sulfamethoxazole-trimethoprim]; Morphine and related; and Prednisone    Past Medical History:   Diagnosis Date   • Chest pain    • DDD (degenerative disc disease), lumbar    • H/O: hysterectomy    • Hx of appendectomy    • Hx of cholecystectomy    • IHD (ischemic heart disease)    • LBBB (left bundle branch block)    • Palpitations    • S/P cataract surgery     left   • SOB (shortness of breath)        Social History     Socioeconomic History   • Marital status:      Spouse name: Not on file   • Number of children: Not on file   • Years of education: Not on file   • Highest education level: Not on file   Tobacco Use   • Smoking status: Current Every Day Smoker     Packs/day: 0.50     Types: Cigarettes   • Smokeless tobacco: Never Used   Substance and Sexual Activity   • Alcohol use: No   • Drug use: No       Family History   Problem Relation Age of Onset   • Heart disease Mother    • Heart disease Father    • Heart disease Other         CABG   • Heart attack Daughter    • Heart disease Daughter        Review of Systems   Constitution: Positive for malaise/fatigue. Negative for night sweats.   Cardiovascular: Positive for chest pain, dyspnea on exertion and palpitations. Negative for claudication, irregular heartbeat, leg swelling, near-syncope and syncope.   Respiratory: Positive for shortness of breath. Negative for cough and wheezing.    Musculoskeletal: Positive for joint pain and stiffness.   Gastrointestinal: Negative for anorexia, heartburn, melena, nausea and vomiting.   Genitourinary: Negative for dysuria, hematuria and hesitancy.   Neurological: Negative for dizziness, headaches and weakness.   Psychiatric/Behavioral: Negative for depression and memory loss. The patient is not nervous/anxious.            Objective     /64 (BP Location: Right arm)   Pulse 68   Ht  "152.4 cm (60\")   BMI 36.13 kg/m²     Physical Exam   Constitutional: She is oriented to person, place, and time. She appears well-developed and well-nourished.   HENT:   Head: Normocephalic and atraumatic.   Eyes: Pupils are equal, round, and reactive to light. EOM are normal.   Neck: Normal range of motion. Neck supple.   Cardiovascular: Normal rate and regular rhythm.   Murmur heard.  Pulmonary/Chest: Effort normal and breath sounds normal.   Abdominal: Soft.   Musculoskeletal: Normal range of motion.   Neurological: She is alert and oriented to person, place, and time.   Skin: Skin is warm and dry.   Psychiatric: She has a normal mood and affect. Her behavior is normal.       Procedures    Assessment/Plan     IHD:  Patient continues to report chest pain daily.  She does admit at times it feels different than prior. She will be urged to repeat cardiac workup to assess areas of stenting for any ischemia, new areas of ischemia, or structural concerns on echo. More recommendations to follow. DAPT therapy continued as bleeding and bruising are denied.     HTN:  Blood pressure is well managed on current. No adjustment to current lopressor or lisinopril therapy encouraged.  Limited sodium intake advised.    Chronic angina:  Continues on current imdur therapy, NTG patch, and ranexa. Same will be advised. If dosing adjustment should be needed, will consider after stress report available.     Hyperlipidemia:  Continues on statin therapy.  Side effects denied. No recent FLP available, but she follows with you in regards. Can we have a copy for our review? She will discuss lipitor dosing adjustments with your office.     HX of CVA:  TIA symptoms denied. DAPT and statin therapy continued.    SOA:  Supplemental oxygen used as needed. Patient denies any worse than prior.    Tobacco abuse:  Long standing history. She smokes at least a pack per day.  Smoking cessation and benefits of cessation discussed once again. She is " currently not ready to quit at present despite concerns.     Weight not obtained today as she is wheelchair bound as she reports joint pain from a bad knee. She was encouraged on good cardiac diet with limited carbs, calories, and activity as tolerated advised.    6 month follow up recommended or sooner if needed.    Refills sent per request.        Problems Addressed this Visit        Cardiovascular and Mediastinum    IHD (ischemic heart disease) - Primary    Relevant Medications    clopidogrel (PLAVIX) 75 MG tablet    nitroglycerin (NITRODUR) 0.6 MG/HR patch    ranolazine (RANEXA) 1000 MG 12 hr tablet    isosorbide mononitrate (IMDUR) 30 MG 24 hr tablet    Other Relevant Orders    Stress Test With Myocardial Perfusion One Day    Adult Transthoracic Echo Complete W/ Cont if Necessary Per Protocol    Hyperlipemia    HTN (hypertension)    Coronary artery disease of native artery of native heart with stable angina pectoris (CMS/HCC)    Relevant Medications    clopidogrel (PLAVIX) 75 MG tablet    aspirin (RA ASPIRIN EC) 81 MG EC tablet    nitroglycerin (NITRODUR) 0.6 MG/HR patch    ranolazine (RANEXA) 1000 MG 12 hr tablet    isosorbide mononitrate (IMDUR) 30 MG 24 hr tablet       Other    Tobacco abuse      Other Visit Diagnoses     Atypical angina (CMS/HCC)        Relevant Medications    clopidogrel (PLAVIX) 75 MG tablet    nitroglycerin (NITRODUR) 0.6 MG/HR patch    ranolazine (RANEXA) 1000 MG 12 hr tablet    isosorbide mononitrate (IMDUR) 30 MG 24 hr tablet    Other Relevant Orders    Stress Test With Myocardial Perfusion One Day    Adult Transthoracic Echo Complete W/ Cont if Necessary Per Protocol    Anginal equivalent (CMS/HCC)        Relevant Medications    clopidogrel (PLAVIX) 75 MG tablet    nitroglycerin (NITRODUR) 0.6 MG/HR patch    ranolazine (RANEXA) 1000 MG 12 hr tablet    isosorbide mononitrate (IMDUR) 30 MG 24 hr tablet    Other Relevant Orders    Stress Test With Myocardial Perfusion One Day    Adult  Transthoracic Echo Complete W/ Cont if Necessary Per Protocol    Shortness of breath        Relevant Orders    Adult Transthoracic Echo Complete W/ Cont if Necessary Per Protocol    History of CVA (cerebrovascular accident)        Obesity (BMI 30-39.9)        Arthralgia of both knees              Patient's Body mass index is 36.13 kg/m². BMI is above normal parameters. Recommendations include: nutrition counseling.      Ana Lenz  reports that she has been smoking cigarettes. She has been smoking about 0.50 packs per day. She has never used smokeless tobacco. She is not currently ready to quit despite concerns.            Electronically signed by JOSE Vásquez February 17, 2020 11:35 AM

## 2020-03-19 ENCOUNTER — APPOINTMENT (OUTPATIENT)
Dept: CARDIOLOGY | Facility: HOSPITAL | Age: 75
End: 2020-03-19

## 2020-06-16 DIAGNOSIS — I10 ESSENTIAL HYPERTENSION: ICD-10-CM

## 2020-06-16 RX ORDER — LISINOPRIL 10 MG/1
10 TABLET ORAL DAILY
Qty: 90 TABLET | Refills: 3 | Status: SHIPPED | OUTPATIENT
Start: 2020-06-16

## 2020-06-18 ENCOUNTER — PRIOR AUTHORIZATION (OUTPATIENT)
Dept: CARDIOLOGY | Facility: CLINIC | Age: 75
End: 2020-06-18

## 2020-06-24 DIAGNOSIS — I25.118 CORONARY ARTERY DISEASE OF NATIVE ARTERY OF NATIVE HEART WITH STABLE ANGINA PECTORIS (HCC): ICD-10-CM

## 2020-06-24 DIAGNOSIS — I25.9 IHD (ISCHEMIC HEART DISEASE): ICD-10-CM

## 2020-06-24 RX ORDER — NITROGLYCERIN 120 MG/1
PATCH TRANSDERMAL
Qty: 90 PATCH | Refills: 3 | Status: SHIPPED | OUTPATIENT
Start: 2020-06-24

## 2020-08-20 ENCOUNTER — OFFICE VISIT (OUTPATIENT)
Dept: CARDIOLOGY | Facility: CLINIC | Age: 75
End: 2020-08-20

## 2020-08-20 VITALS
TEMPERATURE: 98.3 F | DIASTOLIC BLOOD PRESSURE: 78 MMHG | HEIGHT: 60 IN | HEART RATE: 72 BPM | BODY MASS INDEX: 36.13 KG/M2 | SYSTOLIC BLOOD PRESSURE: 130 MMHG

## 2020-08-20 DIAGNOSIS — E78.5 HYPERLIPIDEMIA LDL GOAL <100: ICD-10-CM

## 2020-08-20 DIAGNOSIS — R06.02 SHORTNESS OF BREATH: ICD-10-CM

## 2020-08-20 DIAGNOSIS — I25.9 IHD (ISCHEMIC HEART DISEASE): Primary | ICD-10-CM

## 2020-08-20 DIAGNOSIS — I20.9 ANGINAL SYNDROME (HCC): ICD-10-CM

## 2020-08-20 DIAGNOSIS — I44.7 LBBB (LEFT BUNDLE BRANCH BLOCK): ICD-10-CM

## 2020-08-20 DIAGNOSIS — R00.2 PALPITATIONS: ICD-10-CM

## 2020-08-20 DIAGNOSIS — I73.9 PVD (PERIPHERAL VASCULAR DISEASE) (HCC): ICD-10-CM

## 2020-08-20 DIAGNOSIS — E66.01 SEVERE OBESITY (BMI 35.0-39.9) WITH COMORBIDITY (HCC): ICD-10-CM

## 2020-08-20 DIAGNOSIS — I10 ESSENTIAL HYPERTENSION: ICD-10-CM

## 2020-08-20 DIAGNOSIS — Z72.0 TOBACCO ABUSE: ICD-10-CM

## 2020-08-20 PROCEDURE — 99214 OFFICE O/P EST MOD 30 MIN: CPT | Performed by: NURSE PRACTITIONER

## 2020-08-20 NOTE — PROGRESS NOTES
Chief Complaint   Patient presents with   • Follow-up     For cardiac management. Patient is on aspirin. Last lab work unknown, has not been done this year. States that she has had some sharp chest pain, states that she did have some yesterday in shower. States that she does have some shortness of breath but it is not often. States that she occasionally has some palpitaions.    • Med Refill     Does not need refills at this time. Went over medications verbally.        Cardiac Complaints  dyspnea and palpitations      Subjective   Ana Lenz is a 74 y.o. female with HTN, hyperlipidemia, PVD, chronic angina, and IHD s/p bypass surgery and LA clipping in March 2016.  She also has a history of CVA treated with Coumadin until clipping.  She then developed more leg pain, ischemic ulcer on left foot. She was admitted to the hospital.  Stress test showed anteroseptal infarct with mild tanya-infarct ischemia and LVEF 53%.  She underwent arterial ultrasound which showed significant abnormality followed by peripheral angiogram with angioplasty and stenting of the left mid and distal superficial femoral artery on 8/16/17.  She was discharged with Plavix. In March 2018, she presented to the ER with chest pain and significantly elevated bp.  MI was ruled out. Echo and stress showed anterior wall ischemia with EF around 50%. Cardiac cath showed worsening left main disease and disease of circumflex which were stented.  At discharge, metoprolol was reduced to 12.5 mg BID for bradycardia, and pravastatin changed to Lipitor.  CXR showed rounded, mass-like density in the left lung with further evaluation recommended. History is notable for histoplasmosis.  Arterial doppler showed several distal vascular stenoses. At hospital follow up, the dose of aspirin was increased.     Patient returns today for follow up accompanied by a caregiver.  She denies any new concerns. She does admit to a random episode of chest pain yesterday in the  shower but admits it went quickly away. She does admit to some pressure/pain both with and without exertion, they go away without intervention. She denies any associated symptoms of diaphoresis, N/V. SOA reported, does not often, but does report. Palpitations also reported as rare. Labs have remained followed by PCP but she does not think they have been done this year. No refills currently needed.         Cardiac History  Past Surgical History:   Procedure Laterality Date   • CARDIAC CATHETERIZATION  10/22/2003    non-critical disease of LAD and RCA   • CARDIAC CATHETERIZATION  12/06/2006    sig calcification. IVUS showed no sig lesions   • CARDIAC CATHETERIZATION  02/26/2016    LM 60% (IVUS) 90% LAD, 70% RCA   • CARDIAC CATHETERIZATION  03/28/2018    100% SVG to D1. 95% LM 4.0x12. 75% LCX- 3.0x28 & 3.5x8 Promus   • CARDIOVASCULAR STRESS TEST  03/25/2008    D. Myoview- 53% THR, baseline LBBB, R/O ischemia   • CARDIOVASCULAR STRESS TEST  05/30/2013    L Myoview (LBBB) negative    • CARDIOVASCULAR STRESS TEST  11/09/2015    L. Myoview- (LBBB) Negative    • CARDIOVASCULAR STRESS TEST  10/26/2016    Lexiscan-small lateral wall ischemia, increase Imdur, cath is symptoms persist   • CARDIOVASCULAR STRESS TEST  03/26/2018    @[email or web address]. Dr. Cabrera. SARITHA Myoview- Anterior Ischemia   • CONVERTED (HISTORICAL) HOLTER  08/12/2008    AVG HR 88 bpm, LBBB, rare PVC's and PAC's   • CORONARY ARTERY BYPASS GRAFT  03/18/2016    (Dr. Hernandez) LIMA-LAD, SVG-D1, SVG-PDA, LA clipping   • ECHO - CONVERTED  03/25/2008    EF >60%, LBBB   • ECHO - CONVERTED  01/31/2012    EF 60-65%, RVSP 41 mmHg   • ECHO - CONVERTED  05/30/2013    EF 60-65%, RVSP 44 mmHg   • ECHO - CONVERTED  11/09/2015    EF 65%. RVSP- 50 mmHg    • ECHO - CONVERTED  03/27/2018    @[facility]. EF 60%   • ECHO - CONVERTED  05/03/2019    ([facility]) EF 65%, thickened mitral valve, RVSP 48 mmHg    • PERIPHERAL ARTERIAL STENT GRAFT  08/16/2017    Angioplasty/stenting 6mm x 120mm EverFlex stent of L  mid and distal superficial femoral artery    • US ARTERIAL DOPPLER LOWER EXTREMITY  UNILATERAL  08/11/2017    abnormal flow- significant PVD   • US CAROTID UNILATERAL  09/03/2008    <49% stenosis bilat   • US CAROTID UNILATERAL  11/18/2015    normal       Current Outpatient Medications   Medication Sig Dispense Refill   • ALPRAZolam (XANAX) 0.25 MG tablet Take 0.25 mg by mouth 3 (Three) Times a Day.     • aspirin (RA ASPIRIN EC) 81 MG EC tablet Increase to twice daily 180 tablet 3   • atorvastatin (LIPITOR) 40 MG tablet Take 1 tablet by mouth Daily. 90 tablet 3   • Cholecalciferol (VITAMIN D PO) Take 10,000 Units by mouth Daily.     • clopidogrel (PLAVIX) 75 MG tablet Take 1 tablet by mouth Daily. 90 tablet 3   • ferrous sulfate 325 (65 FE) MG tablet Take 325 mg by mouth Daily With Breakfast.     • furosemide (LASIX) 40 MG tablet Take 40 mg by mouth Daily.     • gabapentin (NEURONTIN) 300 MG capsule Take 300 mg by mouth 3 (Three) Times a Day.     • isosorbide mononitrate (IMDUR) 30 MG 24 hr tablet Take 1 tablet by mouth Every Morning. 90 tablet 3   • lisinopril (PRINIVIL,ZESTRIL) 10 MG tablet TAKE 1 TABLET BY MOUTH DAILY 90 tablet 3   • metoprolol tartrate (LOPRESSOR) 25 MG tablet 1/2 tablet twice a day 90 tablet 3   • nitroglycerin (NITRODUR) 0.6 MG/HR patch APPLY 1 PATCH TO THE SKIN AS DIRECTED(ON FOR 12 HOURS OFF FOR 12 HOURS) 90 patch 3   • nitroglycerin (NITROSTAT) 0.4 MG SL tablet 1 under the tongue as needed for angina, may repeat q5mins for up three doses 100 tablet 11   • O2 (OXYGEN) 1.5 liters PRN     • oxyCODONE-acetaminophen (PERCOCET) 5-325 MG per tablet Take 1 tablet by mouth Every 6 (Six) Hours.     • potassium chloride (K-DUR,KLOR-CON) 20 MEQ CR tablet TAKE 1 TABLET BY MOUTH TWICE DAILY 180 tablet 2   • ranolazine (RANEXA) 1000 MG 12 hr tablet Take 1 tablet by mouth Every 12 (Twelve) Hours. 180 tablet 3     No current facility-administered medications for this visit.        Bactrim  "[sulfamethoxazole-trimethoprim]; Morphine and related; and Prednisone    Past Medical History:   Diagnosis Date   • Chest pain    • DDD (degenerative disc disease), lumbar    • H/O: hysterectomy    • Hx of appendectomy    • Hx of cholecystectomy    • IHD (ischemic heart disease)    • LBBB (left bundle branch block)    • Palpitations    • S/P cataract surgery     left   • SOB (shortness of breath)        Social History     Socioeconomic History   • Marital status:      Spouse name: Not on file   • Number of children: Not on file   • Years of education: Not on file   • Highest education level: Not on file   Tobacco Use   • Smoking status: Current Every Day Smoker     Packs/day: 1.00     Types: Cigarettes   • Smokeless tobacco: Never Used   Substance and Sexual Activity   • Alcohol use: No   • Drug use: No       Family History   Problem Relation Age of Onset   • Heart disease Mother    • Heart disease Father    • Heart disease Other         CABG   • Heart attack Daughter    • Heart disease Daughter        Review of Systems   Constitution: Negative for malaise/fatigue and night sweats.   Cardiovascular: Positive for chest pain and dyspnea on exertion. Negative for claudication, irregular heartbeat, leg swelling, near-syncope, orthopnea, palpitations and syncope.   Respiratory: Positive for shortness of breath. Negative for cough and wheezing.    Musculoskeletal: Positive for stiffness. Negative for back pain, joint pain and joint swelling.   Gastrointestinal: Negative for anorexia, heartburn, melena, nausea and vomiting.   Genitourinary: Negative for dysuria, hematuria, hesitancy and nocturia.   Neurological: Negative for dizziness, light-headedness and loss of balance.   Psychiatric/Behavioral: Negative for depression and memory loss. The patient is not nervous/anxious.            Objective     /78 (BP Location: Left arm)   Pulse 72   Temp 98.3 °F (36.8 °C)   Ht 152.4 cm (60\")   BMI 36.13 kg/m²     "     Physical Exam   Constitutional: She is oriented to person, place, and time. She appears well-developed and well-nourished.   HENT:   Head: Normocephalic and atraumatic.   Eyes: Pupils are equal, round, and reactive to light. EOM are normal.   Neck: Normal range of motion. Neck supple.   Cardiovascular: Normal rate and regular rhythm.   Murmur heard.  Decreased pulses in feet bilaterally   Pulmonary/Chest: Effort normal and breath sounds normal.   Abdominal: Soft.   Musculoskeletal: Normal range of motion.   Neurological: She is alert and oriented to person, place, and time.   Skin: Skin is warm and dry.   Psychiatric: She has a normal mood and affect. Her behavior is normal.       Procedures    Assessment/Plan     IHD:  Patient continues to report chest pain daily and SOA. Patient describes pain with and without exertion. She will be urged to repeat cardiac workup to assess areas of stenting for any ischemia, new areas of ischemia, or structural concerns on echo. More recommendations to follow. DAPT therapy continued as bleeding and bruising are denied.      HTN:  Blood pressure is well managed on current. No adjustment to current lopressor or lisinopril therapy encouraged.  Limited sodium intake advised.     Chronic angina:  Continues on current imdur therapy, NTG patch, and ranexa. Same will be advised. If dosing adjustment should be needed, will consider after stress report available.      Hyperlipidemia:  Continues on statin therapy.  Side effects denied. No recent FLP available, but she follows with you in regards. Order for FLP provided with more recommendations to follow.     HX of CVA:  TIA symptoms denied. DAPT and statin therapy continued.    PVD:  Claudication continues to be reported. DAPT therapy continued. Smoking cessation discussed once again, she is not ready.  No open wounds/ulcerations reported.     SOA:  Supplemental oxygen used as needed. Patient denies any worse than prior.     Tobacco  abuse:  Long standing history. She smokes at least a pack per day.  Smoking cessation and benefits of cessation discussed once again. She is currently not ready to quit at present despite concerns.      Weight not obtained today as she is wheelchair bound as she reports joint pain from a bad knee. She was encouraged on good cardiac diet with limited carbs, calories, and activity as tolerated advised.     6 month follow up recommended or sooner if needed.     Refills sent per request.          Problems Addressed this Visit        Cardiovascular and Mediastinum    IHD (ischemic heart disease) - Primary    Relevant Orders    Stress Test With Myocardial Perfusion One Day    Adult Transthoracic Echo Complete W/ Cont if Necessary Per Protocol    CBC (No Diff)    Comprehensive Metabolic Panel    TSH    Lipid Panel    HTN (hypertension)    Relevant Orders    Stress Test With Myocardial Perfusion One Day    Adult Transthoracic Echo Complete W/ Cont if Necessary Per Protocol    CBC (No Diff)    Comprehensive Metabolic Panel    TSH    Lipid Panel    LBBB (left bundle branch block)    Relevant Orders    Stress Test With Myocardial Perfusion One Day    Adult Transthoracic Echo Complete W/ Cont if Necessary Per Protocol    CBC (No Diff)    Comprehensive Metabolic Panel    TSH    Lipid Panel    PVD (peripheral vascular disease) (CMS/HCC)       Other    Tobacco abuse      Other Visit Diagnoses     Palpitations        Shortness of breath        Relevant Orders    Stress Test With Myocardial Perfusion One Day    Adult Transthoracic Echo Complete W/ Cont if Necessary Per Protocol    CBC (No Diff)    Comprehensive Metabolic Panel    TSH    Lipid Panel    Anginal syndrome (CMS/HCC)        Relevant Orders    Stress Test With Myocardial Perfusion One Day    Adult Transthoracic Echo Complete W/ Cont if Necessary Per Protocol    CBC (No Diff)    Comprehensive Metabolic Panel    TSH    Lipid Panel    Hyperlipidemia LDL goal <100         Relevant Orders    CBC (No Diff)    Comprehensive Metabolic Panel    TSH    Lipid Panel    Severe obesity (BMI 35.0-39.9) with comorbidity (CMS/HCC)              Patient's Body mass index is 36.13 kg/m². Unable to weigh, wheelchair bound.               Electronically signed by JOSE Vásquez August 20, 2020 17:19

## 2020-08-23 DIAGNOSIS — E78.00 PURE HYPERCHOLESTEROLEMIA: ICD-10-CM

## 2020-08-24 RX ORDER — ATORVASTATIN CALCIUM 40 MG/1
40 TABLET, FILM COATED ORAL DAILY
Qty: 90 TABLET | Refills: 0 | Status: SHIPPED | OUTPATIENT
Start: 2020-08-24

## 2020-08-27 ENCOUNTER — APPOINTMENT (OUTPATIENT)
Dept: CARDIOLOGY | Facility: HOSPITAL | Age: 75
End: 2020-08-27

## 2020-08-28 ENCOUNTER — HOSPITAL ENCOUNTER (OUTPATIENT)
Dept: CARDIOLOGY | Facility: HOSPITAL | Age: 75
Discharge: HOME | End: 2020-08-28

## 2020-08-28 DIAGNOSIS — I10 ESSENTIAL HYPERTENSION: ICD-10-CM

## 2020-08-28 DIAGNOSIS — I25.9 IHD (ISCHEMIC HEART DISEASE): ICD-10-CM

## 2020-08-28 DIAGNOSIS — I44.7 LBBB (LEFT BUNDLE BRANCH BLOCK): ICD-10-CM

## 2020-08-28 DIAGNOSIS — R06.02 SHORTNESS OF BREATH: ICD-10-CM

## 2020-08-28 DIAGNOSIS — I20.9 ANGINAL SYNDROME (HCC): ICD-10-CM

## 2020-08-28 PROCEDURE — 0 TECHNETIUM SESTAMIBI: Performed by: INTERNAL MEDICINE

## 2020-08-28 PROCEDURE — A9500 TC99M SESTAMIBI: HCPCS | Performed by: INTERNAL MEDICINE

## 2020-08-28 PROCEDURE — 93018 CV STRESS TEST I&R ONLY: CPT | Performed by: INTERNAL MEDICINE

## 2020-08-28 PROCEDURE — 93016 CV STRESS TEST SUPVJ ONLY: CPT | Performed by: NURSE PRACTITIONER

## 2020-08-28 PROCEDURE — 25010000002 REGADENOSON 0.4 MG/5ML SOLUTION: Performed by: INTERNAL MEDICINE

## 2020-08-28 PROCEDURE — 93306 TTE W/DOPPLER COMPLETE: CPT

## 2020-08-28 PROCEDURE — 93017 CV STRESS TEST TRACING ONLY: CPT

## 2020-08-28 PROCEDURE — 78452 HT MUSCLE IMAGE SPECT MULT: CPT

## 2020-08-28 PROCEDURE — 93306 TTE W/DOPPLER COMPLETE: CPT | Performed by: INTERNAL MEDICINE

## 2020-08-28 PROCEDURE — 78452 HT MUSCLE IMAGE SPECT MULT: CPT | Performed by: INTERNAL MEDICINE

## 2020-08-28 RX ADMIN — TECHNETIUM TC 99M SESTAMIBI 1 DOSE: 1 INJECTION INTRAVENOUS at 10:52

## 2020-08-28 RX ADMIN — REGADENOSON 0.4 MG: 0.08 INJECTION, SOLUTION INTRAVENOUS at 11:03

## 2020-08-28 RX ADMIN — TECHNETIUM TC 99M SESTAMIBI 1 DOSE: 1 INJECTION INTRAVENOUS at 11:03

## 2020-08-30 LAB
BH CV ECHO MEAS - ACS: 2.1 CM
BH CV ECHO MEAS - AO MAX PG (FULL): 1.2 MMHG
BH CV ECHO MEAS - AO MAX PG: 5.6 MMHG
BH CV ECHO MEAS - AO MEAN PG (FULL): 0 MMHG
BH CV ECHO MEAS - AO MEAN PG: 3 MMHG
BH CV ECHO MEAS - AO ROOT AREA (BSA CORRECTED): 1.6
BH CV ECHO MEAS - AO ROOT AREA: 6.6 CM^2
BH CV ECHO MEAS - AO ROOT DIAM: 2.9 CM
BH CV ECHO MEAS - AO V2 MAX: 118 CM/SEC
BH CV ECHO MEAS - AO V2 MEAN: 85.8 CM/SEC
BH CV ECHO MEAS - AO V2 VTI: 31.3 CM
BH CV ECHO MEAS - AVA(I,A): 2.5 CM^2
BH CV ECHO MEAS - AVA(I,D): 2.5 CM^2
BH CV ECHO MEAS - AVA(V,A): 2.8 CM^2
BH CV ECHO MEAS - AVA(V,D): 2.8 CM^2
BH CV ECHO MEAS - BSA(HAYCOCK): 1.9 M^2
BH CV ECHO MEAS - BSA: 1.8 M^2
BH CV ECHO MEAS - BZI_BMI: 36.1 KILOGRAMS/M^2
BH CV ECHO MEAS - BZI_METRIC_HEIGHT: 152.4 CM
BH CV ECHO MEAS - BZI_METRIC_WEIGHT: 83.9 KG
BH CV ECHO MEAS - EDV(CUBED): 25.7 ML
BH CV ECHO MEAS - EDV(MOD-SP4): 59.7 ML
BH CV ECHO MEAS - EDV(TEICH): 33.6 ML
BH CV ECHO MEAS - EF(CUBED): 68.8 %
BH CV ECHO MEAS - EF(MOD-SP4): 73.9 %
BH CV ECHO MEAS - EF(TEICH): 62.1 %
BH CV ECHO MEAS - ESV(CUBED): 8 ML
BH CV ECHO MEAS - ESV(MOD-SP4): 15.6 ML
BH CV ECHO MEAS - ESV(TEICH): 12.7 ML
BH CV ECHO MEAS - FS: 32.2 %
BH CV ECHO MEAS - IVS/LVPW: 1.4
BH CV ECHO MEAS - IVSD: 2.1 CM
BH CV ECHO MEAS - LA DIMENSION: 4.2 CM
BH CV ECHO MEAS - LA/AO: 1.4
BH CV ECHO MEAS - LV DIASTOLIC VOL/BSA (35-75): 33.1 ML/M^2
BH CV ECHO MEAS - LV IVRT: 0.11 SEC
BH CV ECHO MEAS - LV MASS(C)D: 216.3 GRAMS
BH CV ECHO MEAS - LV MASS(C)DI: 119.8 GRAMS/M^2
BH CV ECHO MEAS - LV MAX PG: 4.3 MMHG
BH CV ECHO MEAS - LV MEAN PG: 3 MMHG
BH CV ECHO MEAS - LV SYSTOLIC VOL/BSA (12-30): 8.6 ML/M^2
BH CV ECHO MEAS - LV V1 MAX: 104 CM/SEC
BH CV ECHO MEAS - LV V1 MEAN: 75.7 CM/SEC
BH CV ECHO MEAS - LV V1 VTI: 25.4 CM
BH CV ECHO MEAS - LVIDD: 3 CM
BH CV ECHO MEAS - LVIDS: 2 CM
BH CV ECHO MEAS - LVLD AP4: 7.4 CM
BH CV ECHO MEAS - LVLS AP4: 5.4 CM
BH CV ECHO MEAS - LVOT AREA (M): 3.1 CM^2
BH CV ECHO MEAS - LVOT AREA: 3.1 CM^2
BH CV ECHO MEAS - LVOT DIAM: 2 CM
BH CV ECHO MEAS - LVPWD: 1.5 CM
BH CV ECHO MEAS - MV A MAX VEL: 173 CM/SEC
BH CV ECHO MEAS - MV DEC SLOPE: 536.8 CM/SEC^2
BH CV ECHO MEAS - MV E MAX VEL: 99.3 CM/SEC
BH CV ECHO MEAS - MV E/A: 0.57
BH CV ECHO MEAS - MV MAX PG: 13.6 MMHG
BH CV ECHO MEAS - MV MEAN PG: 6 MMHG
BH CV ECHO MEAS - MV P1/2T MAX VEL: 106.3 CM/SEC
BH CV ECHO MEAS - MV P1/2T: 58 MSEC
BH CV ECHO MEAS - MV V2 MAX: 184.7 CM/SEC
BH CV ECHO MEAS - MV V2 MEAN: 111.3 CM/SEC
BH CV ECHO MEAS - MV V2 VTI: 39.9 CM
BH CV ECHO MEAS - MVA P1/2T LCG: 2.1 CM^2
BH CV ECHO MEAS - MVA(P1/2T): 3.8 CM^2
BH CV ECHO MEAS - MVA(VTI): 2 CM^2
BH CV ECHO MEAS - RAP SYSTOLE: 10 MMHG
BH CV ECHO MEAS - RVDD: 2.7 CM
BH CV ECHO MEAS - RVSP: 52.5 MMHG
BH CV ECHO MEAS - SI(AO): 114.5 ML/M^2
BH CV ECHO MEAS - SI(CUBED): 9.8 ML/M^2
BH CV ECHO MEAS - SI(LVOT): 44.2 ML/M^2
BH CV ECHO MEAS - SI(MOD-SP4): 24.4 ML/M^2
BH CV ECHO MEAS - SI(TEICH): 11.6 ML/M^2
BH CV ECHO MEAS - SV(AO): 206.7 ML
BH CV ECHO MEAS - SV(CUBED): 17.7 ML
BH CV ECHO MEAS - SV(LVOT): 79.8 ML
BH CV ECHO MEAS - SV(MOD-SP4): 44.1 ML
BH CV ECHO MEAS - SV(TEICH): 20.9 ML
BH CV ECHO MEAS - TR MAX VEL: 326 CM/SEC
BH CV STRESS COMMENTS STAGE 1: NORMAL
BH CV STRESS DOSE REGADENOSON STAGE 1: 0.4
BH CV STRESS DURATION MIN STAGE 1: 0
BH CV STRESS DURATION SEC STAGE 1: 10
BH CV STRESS PROTOCOL 1: NORMAL
BH CV STRESS RECOVERY BP: NORMAL MMHG
BH CV STRESS RECOVERY HR: 92 BPM
BH CV STRESS STAGE 1: 1
LV EF NUC BP: 62 %
MAXIMAL PREDICTED HEART RATE: 145 BPM
MAXIMAL PREDICTED HEART RATE: 145 BPM
PERCENT MAX PREDICTED HR: 65.52 %
STRESS BASELINE BP: NORMAL MMHG
STRESS BASELINE HR: 76 BPM
STRESS PERCENT HR: 77 %
STRESS POST PEAK BP: NORMAL MMHG
STRESS POST PEAK HR: 95 BPM
STRESS TARGET HR: 123 BPM
STRESS TARGET HR: 123 BPM